# Patient Record
Sex: FEMALE | Race: WHITE | NOT HISPANIC OR LATINO | URBAN - METROPOLITAN AREA
[De-identification: names, ages, dates, MRNs, and addresses within clinical notes are randomized per-mention and may not be internally consistent; named-entity substitution may affect disease eponyms.]

---

## 2018-11-13 ENCOUNTER — APPOINTMENT (EMERGENCY)
Dept: RADIOLOGY | Facility: HOSPITAL | Age: 28
End: 2018-11-13
Attending: EMERGENCY MEDICINE
Payer: COMMERCIAL

## 2018-11-13 ENCOUNTER — HOSPITAL ENCOUNTER (EMERGENCY)
Facility: HOSPITAL | Age: 28
Discharge: HOME | End: 2018-11-13
Attending: EMERGENCY MEDICINE
Payer: COMMERCIAL

## 2018-11-13 VITALS
BODY MASS INDEX: 23.92 KG/M2 | WEIGHT: 130 LBS | OXYGEN SATURATION: 100 % | HEIGHT: 62 IN | TEMPERATURE: 98.3 F | SYSTOLIC BLOOD PRESSURE: 105 MMHG | HEART RATE: 73 BPM | RESPIRATION RATE: 18 BRPM | DIASTOLIC BLOOD PRESSURE: 56 MMHG

## 2018-11-13 DIAGNOSIS — R10.13 EPIGASTRIC PAIN: ICD-10-CM

## 2018-11-13 DIAGNOSIS — R55 VASOVAGAL SYNCOPE: Primary | ICD-10-CM

## 2018-11-13 LAB
ABO + RH BLD: NORMAL
ALBUMIN SERPL-MCNC: 3.1 G/DL (ref 3.4–5)
ALP SERPL-CCNC: 72 IU/L (ref 35–126)
ALT SERPL-CCNC: 16 IU/L (ref 11–54)
ANION GAP SERPL CALC-SCNC: 5 MEQ/L (ref 3–15)
AST SERPL-CCNC: 24 IU/L (ref 15–41)
BASOPHILS # BLD: 0 K/UL (ref 0.01–0.1)
BASOPHILS NFR BLD: 0 %
BILIRUB DIRECT SERPL-MCNC: 0.1 MG/DL
BILIRUB SERPL-MCNC: 0.4 MG/DL (ref 0.3–1.2)
BLD GP AB SCN SERPL QL: NEGATIVE
BUN SERPL-MCNC: 10 MG/DL (ref 8–20)
CALCIUM SERPL-MCNC: 8.4 MG/DL (ref 8.9–10.3)
CHLORIDE SERPL-SCNC: 107 MEQ/L (ref 98–109)
CO2 SERPL-SCNC: 25 MEQ/L (ref 22–32)
CREAT SERPL-MCNC: 0.7 MG/DL (ref 0.6–1.1)
D AG BLD QL: POSITIVE
DIFFERENTIAL METHOD BLD: ABNORMAL
EOSINOPHIL # BLD: 0.15 K/UL (ref 0.04–0.36)
EOSINOPHIL NFR BLD: 1 %
ERYTHROCYTE [DISTWIDTH] IN BLOOD BY AUTOMATED COUNT: 13.8 % (ref 11.7–14.4)
GFR SERPL CREATININE-BSD FRML MDRD: >60 ML/MIN/1.73M*2
GLUCOSE SERPL-MCNC: 140 MG/DL (ref 70–99)
HCG UR QL: NEGATIVE
HCT VFR BLDCO AUTO: 34.3 % (ref 35–45)
HGB BLD-MCNC: 11 G/DL (ref 11.8–15.7)
LABORATORY COMMENT REPORT: NORMAL
LIPASE SERPL-CCNC: 25 U/L (ref 20–51)
LYMPHOCYTES # BLD: 9.26 K/UL (ref 1.2–3.5)
LYMPHOCYTES NFR BLD: 60 %
MCH RBC QN AUTO: 29.6 PG (ref 28–33.2)
MCHC RBC AUTO-ENTMCNC: 32.1 G/DL (ref 32.2–35.5)
MCV RBC AUTO: 92.5 FL (ref 83–98)
MONOCYTES # BLD: 0.15 K/UL (ref 0.28–0.8)
MONOCYTES NFR BLD: 1 %
NEUTS BAND # BLD: 5.55 K/UL (ref 1.7–7)
NEUTS SEG NFR BLD: 36 %
PATH REV BLD -IMP: NORMAL
PDW BLD AUTO: 9.9 FL (ref 9.4–12.3)
PLAT MORPH BLD: NORMAL
PLATELET # BLD AUTO: 440 K/UL (ref 150–369)
PLATELET # BLD EST: ABNORMAL 10*3/UL
POTASSIUM SERPL-SCNC: 3.7 MEQ/L (ref 3.6–5.1)
PROT SERPL-MCNC: 6.2 G/DL (ref 6–8.2)
RBC # BLD AUTO: 3.71 M/UL (ref 3.93–5.22)
RBC MORPH BLD: NORMAL
SMUDGE CELLS BLD QL SMEAR: ABNORMAL
SODIUM SERPL-SCNC: 137 MEQ/L (ref 136–144)
VARIANT LYMPHS NFR BLD: 2 %
WBC # BLD AUTO: 15.43 K/UL (ref 3.8–10.5)

## 2018-11-13 PROCEDURE — 36415 COLL VENOUS BLD VENIPUNCTURE: CPT | Performed by: PHYSICIAN ASSISTANT

## 2018-11-13 PROCEDURE — 80048 BASIC METABOLIC PNL TOTAL CA: CPT | Performed by: PHYSICIAN ASSISTANT

## 2018-11-13 PROCEDURE — 83690 ASSAY OF LIPASE: CPT | Performed by: PHYSICIAN ASSISTANT

## 2018-11-13 PROCEDURE — 99284 EMERGENCY DEPT VISIT MOD MDM: CPT | Mod: 25

## 2018-11-13 PROCEDURE — 80076 HEPATIC FUNCTION PANEL: CPT | Performed by: PHYSICIAN ASSISTANT

## 2018-11-13 PROCEDURE — 71045 X-RAY EXAM CHEST 1 VIEW: CPT

## 2018-11-13 PROCEDURE — 3E0337Z INTRODUCTION OF ELECTROLYTIC AND WATER BALANCE SUBSTANCE INTO PERIPHERAL VEIN, PERCUTANEOUS APPROACH: ICD-10-PCS | Performed by: EMERGENCY MEDICINE

## 2018-11-13 PROCEDURE — 93005 ELECTROCARDIOGRAM TRACING: CPT | Performed by: PHYSICIAN ASSISTANT

## 2018-11-13 PROCEDURE — 86901 BLOOD TYPING SEROLOGIC RH(D): CPT

## 2018-11-13 PROCEDURE — 25800000 HC PHARMACY IV SOLUTIONS: Performed by: PHYSICIAN ASSISTANT

## 2018-11-13 PROCEDURE — 84703 CHORIONIC GONADOTROPIN ASSAY: CPT | Performed by: PHYSICIAN ASSISTANT

## 2018-11-13 PROCEDURE — 85025 COMPLETE CBC W/AUTO DIFF WBC: CPT | Performed by: PHYSICIAN ASSISTANT

## 2018-11-13 PROCEDURE — 96360 HYDRATION IV INFUSION INIT: CPT

## 2018-11-13 RX ADMIN — SODIUM CHLORIDE 1000 ML: 9 INJECTION, SOLUTION INTRAVENOUS at 14:52

## 2018-11-13 ASSESSMENT — ENCOUNTER SYMPTOMS: SYNCOPE: 1

## 2018-11-13 NOTE — DISCHARGE INSTRUCTIONS
Please return to the ED if you develop worsening symptoms, unable to tolerate fluids by mouth, uncontrollable vomiting or diarrhea, unable to urinate, blood in your stools, vomiting blood, lightheadedness/dizziness, fever, chills, or any other concerning symptoms.     Follow up with your healthcare provider for re-evaluation.

## 2018-11-13 NOTE — ED PROVIDER NOTES
HPI     Chief Complaint   Patient presents with   • Syncope     Pt is a 28yF with PMH of UC who presents to the ED for evaluation s/p syncope x2. Pt reports yesterday she had two episodes of BRBPR c/w UC flair. She was at work today when she developed sharp, epigastric abdominal pain which lasted only a few seconds. After which she passed out. After first syncopal episode a coworker placed an IV and Pt received a liter of IVF, after which she felt much better. She reports she felt otherwise well throughout the day until second episode of pain and syncope, prompting her to come to the ED for evaluation.    Pt reports similar syncopal episode in high school after BRBPR r/t UC flair.    History provided by:  Patient  Syncope   Episode history:  Multiple  Most recent episode:  Today  Duration: seconds.  Timing:  Intermittent  Progression:  Resolved  Context comment:  Sharp pain  Witnessed: yes    Relieved by:  Lying down (IVF bolus)  Worsened by:  Nothing  Associated symptoms: no anxiety, no chest pain, no confusion, no diaphoresis, no difficulty breathing, no dizziness, no fever, no focal weakness, no headaches, no malaise/fatigue, no nausea, no palpitations, no seizures, no shortness of breath, no visual change, no vomiting and no weakness         Patient History     Past Medical History:   Diagnosis Date   • Ulcerative colitis (CMS/HCC) (HCC)        History reviewed. No pertinent surgical history.    History reviewed. No pertinent family history.    Social History   Substance Use Topics   • Smoking status: Never Smoker   • Smokeless tobacco: Never Used   • Alcohol use Yes      Comment: occas       Systems Reviewed from Nursing Triage:          Review of Systems     Review of Systems   Constitutional: Negative for appetite change, chills, diaphoresis, fatigue, fever and malaise/fatigue.   HENT: Negative for sore throat.    Respiratory: Negative for cough and shortness of breath.    Cardiovascular: Positive for  "syncope. Negative for chest pain and palpitations.   Gastrointestinal: Negative for abdominal distention, blood in stool, constipation, diarrhea, nausea and vomiting.   Genitourinary: Negative for difficulty urinating, dysuria, frequency, hematuria and urgency.   Musculoskeletal: Negative for back pain.   Skin: Negative for color change, pallor and rash.   Neurological: Negative for dizziness, focal weakness, seizures, weakness, light-headedness, numbness and headaches.   Psychiatric/Behavioral: Negative for confusion.   All other systems reviewed and are negative.       Physical Exam     ED Triage Vitals   Temp Heart Rate Resp BP SpO2   11/13/18 1405 11/13/18 1405 11/13/18 1405 11/13/18 1405 11/13/18 1405   (!) 36.1 °C (96.9 °F) 71 16 (!) 95/50 100 %      Temp Source Heart Rate Source Patient Position BP Location FiO2 (%) (Set)   11/13/18 1618 11/13/18 1452 11/13/18 1452 11/13/18 1452 --   Tympanic Monitor Lying Right upper arm                      Patient Vitals for the past 24 hrs:   BP Temp Pulse Resp SpO2 Height Weight   11/13/18 1405 (!) 95/50 (!) 36.1 °C (96.9 °F) 71 16 100 % 1.575 m (5' 2\") 59 kg (130 lb)           Physical Exam   Constitutional: She is oriented to person, place, and time. She appears well-developed and well-nourished. No distress.   Cardiovascular: Normal rate, regular rhythm, normal heart sounds and intact distal pulses.    No murmur heard.  Pulmonary/Chest: Effort normal and breath sounds normal. No respiratory distress. She exhibits no tenderness.   Abdominal: Soft. Bowel sounds are normal. She exhibits no distension. There is no tenderness. There is no rebound and no guarding.   Genitourinary: Rectum normal. Rectal exam shows no external hemorrhoid, no internal hemorrhoid, no fissure, no mass, no tenderness, anal tone normal and guaiac negative stool.   Genitourinary Comments: Chaperone: Cathie Waggoner, med student.   Musculoskeletal: Normal range of motion. She exhibits no edema. "   Neurological: She is alert and oriented to person, place, and time.   Skin: Skin is warm and dry. Capillary refill takes less than 2 seconds.   Nursing note and vitals reviewed.           ECG 12 lead  Date/Time: 11/13/2018 2:03 PM  Performed by: CORTES CARLTON  Authorized by: GUSTABO GOOD     ECG reviewed by ED Physician in the absence of a cardiologist: yes    Interpretation:     Interpretation: normal    Rate:     ECG rate:  66    ECG rate assessment: normal    Rhythm:     Rhythm: sinus rhythm    QRS:     QRS axis:  Normal    QRS intervals:  Normal  Conduction:     Conduction: normal    ST segments:     ST segments:  Normal  T waves:     T waves: normal          ED Course & MDM     Labs Reviewed   BASIC METABOLIC PANEL - Abnormal        Result Value    Sodium 137      Potassium 3.7      Chloride 107      CO2 25      BUN 10      Creatinine 0.7      Glucose 140 (*)     Calcium 8.4 (*)     eGFR >60.0      Anion Gap 5     HEPATIC FUNCTION PANEL - Abnormal     Albumin 3.1 (*)     Bilirubin, Total 0.4      Bilirubin, Direct 0.1      Alkaline Phosphatase 72      AST (SGOT) 24      ALT (SGPT) 16      Total Protein 6.2     CBC - Abnormal     WBC 15.43 (*)     RBC 3.71 (*)     Hemoglobin 11.0 (*)     Hematocrit 34.3 (*)     MCV 92.5      MCH 29.6      MCHC 32.1 (*)     RDW 13.8      Platelets 440 (*)     MPV 9.9     DIFF COUNT - Abnormal     Differential Type Manu      Neutrophils 36      Lymphocytes 60      Monocytes 1      Eosinophils 1      Basophils 0      Atypical Lymphocytes 2      Neutrophils, Absolute 5.55      Lymphocytes, Absolute 9.26 (*)     Monocytes, Absolute 0.15 (*)     Eosinophils, Absolute 0.15      Basophils, Absolute 0.00 (*)     Platelet Estimate Increased (>400,000)      RBC Morphology Normal      PLT Morphology Normal      Smudge Cells Occasional     BHCG, SERUM, QUAL - Normal    Preg Test, Serum Negative     LIPASE - Normal    Lipase 25     CBC AND DIFFERENTIAL    Narrative:     The following  orders were created for panel order CBC and differential.  Procedure                               Abnormality         Status                     ---------                               -----------         ------                     CBC[[id number]]                           Abnormal            Final result               Diff Count[[id number]]                    Abnormal            Final result               Pathology Slide Review[[id number]]                            Final result                 Please view results for these tests on the individual orders.   TYPE AND SCREEN    Antibody Screen Negative      ABO O      Rh Factor Positive      History Check No type on file     PATH REVIEW    Pathology Review        Value: Absolute lymphocytosis, rule out viral infection,rule out lymphoproliferative disorder.     Electronically signed by Андрей Carroll MD on November 13, 2018 at 3:45 PM.       X-RAY CHEST 1 VIEW   Final Result   IMPRESSION: No acute abnormality in the chest.      COMMENT: An AP radiograph of the chest is reviewed without prior studies   available for direct comparison. The lung fields are well aerated without focal   consolidation to suggest pneumonia. There is no pleural effusion, pulmonary   edema, or pneumothorax. The cardiomediastinal silhouette is within normal   limits. The visualized osseous structures are unremarkable.         ECG 12 lead    (Results Pending)               [facility]         ED Course as of Nov 14 2143 Wed Nov 14, 2018 2142 Mild anemia, moderate leukocytosis w/o focal abd findings on exam.  Negative orthostatis VS after 1L IVF bolus.   Will d/c home to f/u with PCP and YOVANI Chan. Pt agreeable.  [KL]      ED Course User Index  [KL] Yue Sullivan PA C         Clinical Impressions as of Nov 14 2143   Vasovagal syncope   Epigastric pain - sudden, resolved     Disposition: Discharge       Yue Sullivan PA C  11/14/18 2143

## 2018-11-14 ASSESSMENT — ENCOUNTER SYMPTOMS
WEAKNESS: 0
DIZZINESS: 0
LIGHT-HEADEDNESS: 0
FATIGUE: 0
SORE THROAT: 0
BLOOD IN STOOL: 0
HEADACHES: 0
FREQUENCY: 0
HEMATURIA: 0
VISUAL CHANGE: 0
DIAPHORESIS: 0
DIARRHEA: 0
DIFFICULTY BREATHING: 0
DIFFICULTY URINATING: 0
FOCAL WEAKNESS: 0
VOMITING: 0
DYSURIA: 0
COUGH: 0
NUMBNESS: 0
CHILLS: 0
PALPITATIONS: 0
SEIZURES: 0
COLOR CHANGE: 0
FEVER: 0
APPETITE CHANGE: 0
NAUSEA: 0
SHORTNESS OF BREATH: 0
CONFUSION: 0
CONSTIPATION: 0
BACK PAIN: 0
ABDOMINAL DISTENTION: 0

## 2018-11-14 NOTE — ED ATTESTATION NOTE
I have personally seen and examined the patient.  I reviewed and agree with physician assistant / nurse practitioner’s assessment and plan of care, with the following exceptions: None  My examination, assessment, and plan of care of Graciela Paredes is as follows:     Exam: Well-appearing, no acute distress, awake alert oriented x3, regular rate and rhythm, lungs clear to auscultation, abdomen soft nontender, no guarding or rebound, normal pulses no lower extremity edema    Assessment and plan: Patient with episodes of syncope after severe epigastric abdominal pain, had preceding symptoms has passed out previously, now fully recovered, labs unremarkable, abdomen benign and no continued abdominal pain, likely vasovagal, symptomatic treatment and outpatient follow-up    Normal sinus rhythm at 73, pulse ox 100% normal     Dave Paulino,   11/13/18 8089

## 2018-11-15 ENCOUNTER — TRANSCRIBE ORDERS (OUTPATIENT)
Dept: SCHEDULING | Age: 28
End: 2018-11-15

## 2018-11-15 DIAGNOSIS — R10.84 GENERALIZED ABDOMINAL PAIN: Primary | ICD-10-CM

## 2018-11-15 LAB
ATRIAL RATE: 66
P AXIS: 53
PR INTERVAL: 172
QRS DURATION: 74
QT INTERVAL: 386
QTC CALCULATION(BAZETT): 404
R AXIS: 44
T WAVE AXIS: 32
VENTRICULAR RATE: 66

## 2022-11-23 ENCOUNTER — OFFICE VISIT (OUTPATIENT)
Dept: GASTROENTEROLOGY | Facility: CLINIC | Age: 32
End: 2022-11-23

## 2022-11-23 ENCOUNTER — APPOINTMENT (OUTPATIENT)
Dept: LAB | Facility: CLINIC | Age: 32
End: 2022-11-23

## 2022-11-23 VITALS
DIASTOLIC BLOOD PRESSURE: 70 MMHG | WEIGHT: 132 LBS | BODY MASS INDEX: 24.29 KG/M2 | TEMPERATURE: 97.7 F | SYSTOLIC BLOOD PRESSURE: 108 MMHG | HEIGHT: 62 IN

## 2022-11-23 DIAGNOSIS — K51.811 OTHER ULCERATIVE COLITIS WITH RECTAL BLEEDING (HCC): ICD-10-CM

## 2022-11-23 DIAGNOSIS — M19.90 ARTHRITIS: ICD-10-CM

## 2022-11-23 DIAGNOSIS — R19.4 CHANGE IN BOWEL HABIT: ICD-10-CM

## 2022-11-23 DIAGNOSIS — K51.811 OTHER ULCERATIVE COLITIS WITH RECTAL BLEEDING (HCC): Primary | ICD-10-CM

## 2022-11-23 DIAGNOSIS — K62.5 RECTAL BLEEDING: ICD-10-CM

## 2022-11-23 PROBLEM — K51.90 ULCERATIVE COLITIS (HCC): Status: ACTIVE | Noted: 2022-11-23

## 2022-11-23 LAB
ALBUMIN SERPL BCP-MCNC: 2.8 G/DL (ref 3.5–5)
ALP SERPL-CCNC: 87 U/L (ref 46–116)
ALT SERPL W P-5'-P-CCNC: 17 U/L (ref 12–78)
ANION GAP SERPL CALCULATED.3IONS-SCNC: 7 MMOL/L (ref 4–13)
AST SERPL W P-5'-P-CCNC: 15 U/L (ref 5–45)
BASOPHILS # BLD MANUAL: 0 THOUSAND/UL (ref 0–0.1)
BASOPHILS NFR MAR MANUAL: 0 % (ref 0–1)
BILIRUB SERPL-MCNC: 0.29 MG/DL (ref 0.2–1)
BUN SERPL-MCNC: 9 MG/DL (ref 5–25)
CALCIUM ALBUM COR SERPL-MCNC: 9.8 MG/DL (ref 8.3–10.1)
CALCIUM SERPL-MCNC: 8.8 MG/DL (ref 8.3–10.1)
CHLORIDE SERPL-SCNC: 104 MMOL/L (ref 96–108)
CO2 SERPL-SCNC: 24 MMOL/L (ref 21–32)
CREAT SERPL-MCNC: 0.73 MG/DL (ref 0.6–1.3)
EOSINOPHIL # BLD MANUAL: 1.36 THOUSAND/UL (ref 0–0.4)
EOSINOPHIL NFR BLD MANUAL: 7 % (ref 0–6)
ERYTHROCYTE [DISTWIDTH] IN BLOOD BY AUTOMATED COUNT: 13.8 % (ref 11.6–15.1)
GFR SERPL CREATININE-BSD FRML MDRD: 107 ML/MIN/1.73SQ M
GLUCOSE SERPL-MCNC: 88 MG/DL (ref 65–140)
HBV SURFACE AB SER-ACNC: >1000 MIU/ML
HBV SURFACE AG SER QL: NORMAL
HCT VFR BLD AUTO: 38.1 % (ref 34.8–46.1)
HGB BLD-MCNC: 12.4 G/DL (ref 11.5–15.4)
LYMPHOCYTES # BLD AUTO: 33 % (ref 14–44)
LYMPHOCYTES # BLD AUTO: 6.4 THOUSAND/UL (ref 0.6–4.47)
MCH RBC QN AUTO: 29.7 PG (ref 26.8–34.3)
MCHC RBC AUTO-ENTMCNC: 32.5 G/DL (ref 31.4–37.4)
MCV RBC AUTO: 91 FL (ref 82–98)
MONOCYTES # BLD AUTO: 1.55 THOUSAND/UL (ref 0–1.22)
MONOCYTES NFR BLD: 8 % (ref 4–12)
NEUTROPHILS # BLD MANUAL: 10.09 THOUSAND/UL (ref 1.85–7.62)
NEUTS BAND NFR BLD MANUAL: 9 % (ref 0–8)
NEUTS SEG NFR BLD AUTO: 43 % (ref 43–75)
PLATELET # BLD AUTO: 691 THOUSANDS/UL (ref 149–390)
PLATELET BLD QL SMEAR: ABNORMAL
PMV BLD AUTO: 9.2 FL (ref 8.9–12.7)
POTASSIUM SERPL-SCNC: 3.8 MMOL/L (ref 3.5–5.3)
PROT SERPL-MCNC: 7.3 G/DL (ref 6.4–8.4)
RBC # BLD AUTO: 4.17 MILLION/UL (ref 3.81–5.12)
SODIUM SERPL-SCNC: 135 MMOL/L (ref 135–147)
WBC # BLD AUTO: 19.4 THOUSAND/UL (ref 4.31–10.16)

## 2022-11-23 RX ORDER — PREDNISONE 20 MG/1
20 TABLET ORAL DAILY
Qty: 30 TABLET | Refills: 1 | Status: SHIPPED | OUTPATIENT
Start: 2022-11-23 | End: 2022-12-23

## 2022-11-23 RX ORDER — POLYETHYLENE GLYCOL 3350 17 G/17G
POWDER, FOR SOLUTION ORAL
Qty: 238 G | Refills: 0 | Status: SHIPPED | OUTPATIENT
Start: 2022-11-23

## 2022-11-23 NOTE — PROGRESS NOTES
[facility] - Outpatient Consultation  Osmel Boland 27 y o  female MRN: [id number]  Encounter: [id number]          ASSESSMENT AND PLAN:      1  Other ulcerative colitis with rectal bleeding (HCC)  - Stool Enteric Bacterial Panel by PCR; Future  - Calprotectin,Fecal; Future  - Giardia antigen; Future  - Clostridium difficile toxin by PCR; Future  - TPMT ENZYME ACTIVITY,BLOOD; Future  - Hepatitis B surface antibody; Future  - Hepatitis B surface antigen; Future  - CBC and differential; Future  - Comprehensive metabolic panel; Future  - predniSONE 20 mg tablet; Take 1 tablet (20 mg total) by mouth daily  Dispense: 30 tablet; Refill: 1  - hydrocortisone-pramoxine (PROCTOFOAM-HC) 1-1 % FOAM rectal foam; Insert 1 applicator into the rectum 2 (two) times a day  Dispense: 30 g; Refill: 2  - Colonoscopy; Future  - polyethylene glycol (GLYCOLAX) 17 GM/SCOOP powder; At 5pm take 5mgx2 dulcolax  At 6pm 32oz miralax in 64oz gatorade  Drink 8oz glass every 5 mins until 32oz finished  Drink remaining as rec  Dispense: 238 g; Refill: 0  - Quantiferon TB Gold Plus; Future    2  Rectal bleeding  - Colonoscopy; Future    3  Arthritis  4  Change in bowel habit    - Celiac Disease Panel; Future  - Ova and parasite examination; Future  - Colonoscopy; Future    ______________________________________________________________________    HPI:      She is a 77-year-old female with previous history of moderate ulcerative colitis with possible history of pan colitis  Previous records are not available to us so history was mostly obtained from the patient  She would like to proceed with more of consider management  She has previously tried adalimumab and mesalamine based products  Adalimumab was uptitrated to Q weekly but due to side effects and poor tolerance she requested to discontinue it  Saw the side effects she was experiencing were abdominal discomfort, joint pain    She did not have a good response or complete remission  She did have some improvement of some gastrointestinal symptoms  She has had a flare over the last several weeks  She did have steroids at home which she started taking and has had some improvement  She has taken steroids for the last 3 weeks starting with prednisone 40 mg and titrating down to 10 mg now  During the flare she has had fecal incontinence and unable to complete her ADLs  Complicating the flare she also has extraintestinal manifestation with ankle pain  She would prefer to avoid immunosuppressive medication if possible  She was diagnosed with ulcerative colitis at age of 4  She has seen multiple gastroenterologists in the past   She lives an hour away from here  REVIEW OF SYSTEMS:    CONSTITUTIONAL: Denies any fever, chills, rigors, and weight loss  HEENT: No earache or tinnitus  Denies hearing loss or visual disturbances  CARDIOVASCULAR: No chest pain or palpitations  RESPIRATORY: Denies any cough, hemoptysis, shortness of breath or dyspnea on exertion  GASTROINTESTINAL: As noted in the History of Present Illness  GENITOURINARY: No problems with urination  Denies any hematuria or dysuria  NEUROLOGIC: No dizziness or vertigo, denies headaches  MUSCULOSKELETAL: Denies any muscle or joint pain  SKIN: Denies skin rashes or itching  ENDOCRINE: Denies excessive thirst  Denies intolerance to heat or cold  PSYCHOSOCIAL: Denies depression or anxiety  Denies any recent memory loss  Historical Information   History reviewed  No pertinent past medical history  History reviewed  No pertinent surgical history    Social History   Social History     Substance and Sexual Activity   Alcohol Use Never     Social History     Substance and Sexual Activity   Drug Use Never     Social History     Tobacco Use   Smoking Status Never   Smokeless Tobacco Never     Family History   Problem Relation Age of Onset   • Colon cancer Maternal Grandfather    • Crohn's disease Maternal Uncle        Meds/Allergies       Current Outpatient Medications:   •  hydrocortisone-pramoxine (PROCTOFOAM-HC) 1-1 % FOAM rectal foam  •  polyethylene glycol (GLYCOLAX) 17 GM/SCOOP powder  •  predniSONE 20 mg tablet    No Known Allergies        Objective     Blood pressure 108/70, temperature 97 7 °F (36 5 °C), temperature source Tympanic, height 5' 2" (1 575 m), weight 59 9 kg (132 lb)  Body mass index is 24 14 kg/m²  PHYSICAL EXAM:      General Appearance:   Alert, cooperative, no distress   HEENT:   Normocephalic, atraumatic, anicteric      Neck:  Supple, symmetrical, trachea midline   Lungs:   Clear to auscultation bilaterally; no rales, rhonchi or wheezing; respirations unlabored    Heart[de-identified]   Regular rate and rhythm; no murmur, rub, or gallop  Abdomen:   Soft, non-tender, non-distended; normal bowel sounds; no masses, no organomegaly    Genitalia:   Deferred    Rectal:   Deferred    Extremities:  No cyanosis, clubbing or edema    Pulses:  2+ and symmetric    Skin:  No jaundice, rashes, or lesions    Lymph nodes:  No palpable cervical lymphadenopathy        Lab Results:   No visits with results within 1 Day(s) from this visit  Latest known visit with results is:   No results found for any previous visit  Radiology Results:   No results found

## 2022-11-23 NOTE — PATIENT INSTRUCTIONS
Scheduled date of colonoscopy (as of today): 12/23/22  Physician performing colonoscopy: Dr Her   Location of colonoscopy: [place]   Bowel prep reviewed with patient: miralax  Instructions reviewed with patient by: Colby Lawton   Clearances:  n/a   Ok'd by GI Lab

## 2022-11-25 ENCOUNTER — TELEPHONE (OUTPATIENT)
Dept: GASTROENTEROLOGY | Facility: AMBULARY SURGERY CENTER | Age: 32
End: 2022-11-25

## 2022-11-25 LAB
ENDOMYSIUM IGA SER QL: NEGATIVE
GLIADIN PEPTIDE IGA SER-ACNC: 9 UNITS (ref 0–19)
GLIADIN PEPTIDE IGG SER-ACNC: 28 UNITS (ref 0–19)
IGA SERPL-MCNC: 254 MG/DL (ref 87–352)
TTG IGA SER-ACNC: <2 U/ML (ref 0–3)
TTG IGG SER-ACNC: <2 U/ML (ref 0–5)

## 2022-11-25 NOTE — TELEPHONE ENCOUNTER
Patients GI provider:  Dr. Her    Number to return call: (  [phone]    Reason for call: Pt calling to reschedule her colon    Scheduled procedure/appointment date if applicable: Apt/procedure  12/23/22

## 2022-11-28 LAB
GAMMA INTERFERON BACKGROUND BLD IA-ACNC: 0.04 IU/ML
M TB IFN-G BLD-IMP: NEGATIVE
M TB IFN-G CD4+ BCKGRND COR BLD-ACNC: -0.01 IU/ML
M TB IFN-G CD4+ BCKGRND COR BLD-ACNC: 0 IU/ML
MITOGEN IGNF BCKGRD COR BLD-ACNC: >10 IU/ML
REF LAB TEST METHOD: NORMAL
TEST INTERPRETATION: NORMAL
TPMT RBC-CCNC: 8.7 UNITS/ML RBC

## 2022-11-29 NOTE — PROGRESS NOTES
TC from pt calling to r/s procedure  Scheduled date of colonoscopy (as of today):  1/10/23  Physician performing colonoscopy:  Dr Dave Espinoza  Location of colonoscopy:  [facility]  Bowel prep reviewed with patient:  Dulcolax/Miralax  Instructions reviewed with patient by:  Clearances: n/a    ASC Assessment    If the patient answers yes to any of these questions, schedule in a hospital  Are you pregnant: No  Do you rely on a wheelchair for mobility: No  Have you been diagnosed with End Stage Renal Disease (ESRD): No  Do you need oxygen during the day: No  Have you had a heart attack or stroke within the past three months: No  Have you had a seizure within the past three months: No  Have you ever been informed by anesthesia that you have a difficult airway: No  Additional Questions  Have you had any cardiac testing or are under the care of a Cardiologist (see cardiac list): No  Cardiac list:   Do you have an implanted cardiac defibrillator: No (Comment:  This patient should be scheduled in the hospital)    Have any bleeding problems, such as anemia or hemophilia (If patient has H&H result below 8, schedule in hospital   H&H must be within 30 days of procedure): No    Had an organ transplant within the past 3 months: No    Do you have any present infections: No  Do you get short of breath when walking a few blocks: No  Have you been diagnosed with diabetes: No  Comments (provide cardiac provider information if applicable):

## 2022-12-01 ENCOUNTER — TELEPHONE (OUTPATIENT)
Dept: GASTROENTEROLOGY | Facility: CLINIC | Age: 32
End: 2022-12-01

## 2022-12-01 DIAGNOSIS — A04.72 C. DIFFICILE COLITIS: Primary | ICD-10-CM

## 2022-12-01 DIAGNOSIS — K51.811 OTHER ULCERATIVE COLITIS WITH RECTAL BLEEDING (HCC): Primary | ICD-10-CM

## 2022-12-01 RX ORDER — VANCOMYCIN HYDROCHLORIDE 125 MG/1
125 CAPSULE ORAL 4 TIMES DAILY
Qty: 56 CAPSULE | Refills: 0 | Status: SHIPPED | OUTPATIENT
Start: 2022-12-01 | End: 2022-12-15

## 2022-12-01 RX ORDER — PREDNISONE 1 MG/1
5 TABLET ORAL DAILY
Qty: 5 TABLET | Refills: 0 | Status: SHIPPED | OUTPATIENT
Start: 2022-12-01 | End: 2022-12-06

## 2022-12-01 NOTE — TELEPHONE ENCOUNTER
Patient called in this morning stating [facility] called her last night saying the had urgent results about her stool test, they thought they were calling the office but called the patient  So she would like to know what to do since she started the steroids already thinking everything was negative   Please advise

## 2022-12-04 ENCOUNTER — TELEPHONE (OUTPATIENT)
Dept: OTHER | Facility: OTHER | Age: 32
End: 2022-12-04

## 2022-12-04 ENCOUNTER — TELEPHONE (OUTPATIENT)
Dept: GASTROENTEROLOGY | Facility: CLINIC | Age: 32
End: 2022-12-04

## 2022-12-05 ENCOUNTER — PREP FOR PROCEDURE (OUTPATIENT)
Dept: GASTROENTEROLOGY | Facility: CLINIC | Age: 32
End: 2022-12-05

## 2022-12-05 ENCOUNTER — TELEPHONE (OUTPATIENT)
Dept: GASTROENTEROLOGY | Facility: CLINIC | Age: 32
End: 2022-12-05

## 2022-12-05 NOTE — TELEPHONE ENCOUNTER
Dennis Morrow from [facility] called wanting to speak with on call regarding a consult for patient  On call notified via TC

## 2022-12-05 NOTE — TELEPHONE ENCOUNTER
Patients GI provider:  Dr Jayla Lazo    Number to return call: [phone]     Reason for call:   pt William Peña  1990 requesting to speak with a nurse  patients boyfriend called very upset stating we overlooked a lab for patient and was misdiagnosed  patient is currently very sick and wanting to speak with someone asap please reach out to patient   Boyfriend thinks she is having a reaction to medication that was prescribed by Dr Jayla Lazo thank you     Scheduled procedure/appointment date if applicable: n/a

## 2022-12-05 NOTE — TELEPHONE ENCOUNTER
Information only from health care provider at [facility]  I received a notification from health call RN regarding ER team trying to reach me from [facility] as patient is currently there  Their question was whether the patient should continue steroids which were recently prescribed  And to arrange close outpatient follow up  As per ER team, patient presented with not being able to tolerate diet but after symptomatic therapy feeling better  They obtained CT which showed diffuse colitis  I checked to see if the patient is not tolerating PO diet she might need admission; but was told she is doing better now and was appropriate for dc from their standpoint  To answer their question I recommended to hold steroids till she can finish the steroid therapy; and I sent a msg to the scheduling team to arrange sooner follow up

## 2022-12-05 NOTE — TELEPHONE ENCOUNTER
Katarina Zazueta called from [facility] called, requesting a call back from on call provider, regarding a consultation

## 2022-12-05 NOTE — TELEPHONE ENCOUNTER
Spoke with patient and with verbal consent to patient's boyfriend at length  Patient stated she was seen in office 11/23 and stool testing was ordered  Patient stated she was originally diagnosed with a flare of ulcerative colitis  Then, received a call from office informing her that her results were normal  Finally, on December 1st patient received a call that she is positive for C  Diff  Patient expressed concerns that no one from the office educated her regarding C  Diff precautions  I reviewed C  Diff precautions with patient  Patient stated she has been having intercourse with her boyfriend and he is now experiencing diarrhea  I advised patient that patient's boyfriend should be evaluated by a medical professional regarding diarrhea  Patient stated she was evaluated in the ER yesterday due to a fever and being unable to tolerate PO intake  I apologized to patient regarding miscommunication and allowed patient to express her concerns  Patient then had her boyfriend speak to me and he expressed frustration and concern regarding her care  Patient's boyfriend explained the issue again regarding test results  Patient's boyfriend stated that patient is unable to keep any foods or liquids down  Patient's boyfriend expressed frustration regarding "misdiagnosis" and stated that I "almost killed her"  I apologized to patient's boyfriend and explained that I am the nurse at the office and was not involved in patient's care  Patient's boyfriend also expressed concern regarding call center breaking HIPAA  I apologized to patient's boyfriend and explained to him that I unfortunately do not work at the call center and this will be adressed with their management  Patient's boyfriend stated that he was expecting a call from Dr Trina Elam and has not yet  Patient's boyfriend stated 2-3 times that he is expecting a call from Dr Trina Elam within one hour or he will show up to the office to speak with him in person   I apologized to patient's boyfriend about their experience and reassured patient and her boyfriend that it will be addressed  I began explaining that I would reach out to Dr Khris Perez and he will call patient as soon as possible  Patient's boyfriend hung up phone mid conversation  Spoke with Dr Khris Perez and he explained that he would reach out to patient

## 2022-12-06 NOTE — TELEPHONE ENCOUNTER
Called and spoke with patient regarding HIPPA violation complaint  I apologized to patient if she thought there was breach  Pt express because the person her boyfriend was talking to did not ask for her name or date of birth, they thought this was a HIPPA violation  Pt then expressed it wasn't really HIPPA as her name is attached to the phone number  I explained to the patient that our calling system does have caller ID and the agents are able to identify the information with that  The patient was satisfied with that and stated someone was supposed to call her and schedule a video visit with Dr Ramandeep Forte within 2-3 days per Dr Ramandeep Forte  I apologized that no one has reached out to her and informed her I could schedule that appt for her  I scheduled her virtual visit for tomorrow, as I was in the process of scheduling, pt stated she was getting a call from the office  After scheduling the appt, pt agreed and I informed her if that time did not work she can call back to reschedule the appt  Pt agreed and then took her other call

## 2022-12-06 NOTE — TELEPHONE ENCOUNTER
Discussed findings of c diff with the patient  She was also diagnosed with COVID  It is an unfortunate situation as c diff has led to an active IBD flare  She was upset as she feel she was not given more detailed info regarding c diff  We will plan to address this part better  Unfortunately, the steroids were started before she saw us in the office which led to her symptoms getting progressively worse  I also suspect she likely has c diff for several months based on her symptoms  Today I discussed the followin  Wean off of steroids over the next 3 days   2  Continue anti nausea meds and oral vancomycin if she cannot tolerate vancomycin due to nausea consider hospitalization   3  We would fill out paperwork to help her get time off from work   4   Plan for virtual visit in next 2-3 days

## 2022-12-07 ENCOUNTER — TELEPHONE (OUTPATIENT)
Dept: OTHER | Facility: OTHER | Age: 32
End: 2022-12-07

## 2022-12-07 ENCOUNTER — TELEMEDICINE (OUTPATIENT)
Dept: GASTROENTEROLOGY | Facility: CLINIC | Age: 32
End: 2022-12-07

## 2022-12-07 DIAGNOSIS — A04.72 C. DIFFICILE COLITIS: ICD-10-CM

## 2022-12-07 DIAGNOSIS — R11.0 NAUSEA: ICD-10-CM

## 2022-12-07 DIAGNOSIS — K51.018 ULCERATIVE PANCOLITIS WITH OTHER COMPLICATION (HCC): Primary | ICD-10-CM

## 2022-12-07 DIAGNOSIS — U07.1 COVID: ICD-10-CM

## 2022-12-07 DIAGNOSIS — R19.4 CHANGE IN BOWEL HABIT: ICD-10-CM

## 2022-12-07 DIAGNOSIS — K62.5 RECTAL BLEEDING: ICD-10-CM

## 2022-12-07 RX ORDER — VANCOMYCIN HYDROCHLORIDE 125 MG/1
125 CAPSULE ORAL 4 TIMES DAILY
Qty: 12 CAPSULE | Refills: 0 | Status: SHIPPED | OUTPATIENT
Start: 2022-12-07 | End: 2022-12-10

## 2022-12-07 NOTE — PROGRESS NOTES
Virtual Regular Visit    Verification of patient location:    Patient is located in the following state in which I hold an active license [place]      Assessment/Plan:    Problem List Items Addressed This Visit        Digestive    Ulcerative colitis ([address] ) - Primary    Rectal bleeding    C  difficile colitis    Relevant Medications    vancomycin (VANCOCIN) 125 MG capsule       Other    Change in bowel habit    Relevant Medications    vancomycin (VANCOCIN) 125 MG capsule    COVID    Nausea     She was scheduled for urgent visit due to multiple comorbidities    - continue 14 days of vancomycin oral  - continue anti nausea medication   - start probiotics   -Taper steroids  -Close follow-up due to debilitating symptoms      Reason for visit is   Chief Complaint   Patient presents with   • Follow-up     Discuss treatment    • Virtual Regular Visit        Encounter provider Thong Hong MD    Provider located at [address] [place] [address] [facility]  [address] [facility] 76  [phone]      Recent Visits  Date Type Provider Dept   12/05/22 Telephone MD Cee Lainez   12/05/22 Telephone [address] [facility] [address]   12/01/22 Telephone Jasmin Conchita [address] [facility]   Showing recent visits within past 7 days and meeting all other requirements  Today's Visits  Date Type Provider Dept   12/07/22 Shira Caruso MD [facility] today's visits and meeting all other requirements  Future Appointments  No visits were found meeting these conditions  Showing future appointments within next 150 days and meeting all other requirements       The patient was identified by name and date of birth  Bahman Neumann was informed that this is a telemedicine visit and that the visit is being conducted through the [employer]  She agrees to proceed     My office door was closed  No one else was in the room  She acknowledged consent and understanding of privacy and security of the video platform  The patient has agreed to participate and understands they can discontinue the visit at any time  Patient is aware this is a billable service  Brandon Pérez is a 27 y o  female with history of ulcerative colitis at the age of age presents here for virtual visit for follow-up  She had an acute flare of IBD and she started steroids which she had leftover from previous flares  Symptoms started back in September 2022  Symptoms did mildly improve with steroid therapy but stool studies demonstrated C  difficile colitis which is likely the cause of acute symptoms  Patient also informs he was diagnosed with COVID in setting of recent steroid use  She is debility and frustrated by her symptoms  She is also having difficulty  with tolerating oral vancomycin  She is well-hydrated and does not require to be hospitalized at this time  She was recently hospitalized for dehydration and supportive care  HPI     No past medical history on file  No past surgical history on file  Current Outpatient Medications   Medication Sig Dispense Refill   • vancomycin (VANCOCIN) 125 MG capsule Take 1 capsule (125 mg total) by mouth 4 (four) times a day for 3 days 12 capsule 0   • hydrocortisone-pramoxine (PROCTOFOAM-HC) 1-1 % FOAM rectal foam Insert 1 applicator into the rectum 2 (two) times a day 30 g 2   • polyethylene glycol (GLYCOLAX) 17 GM/SCOOP powder At 5pm take 5mgx2 dulcolax  At 6pm 32oz miralax in 64oz gatorade  Drink 8oz glass every 5 mins until 32oz finished  Drink remaining as rec  238 g 0   • predniSONE 20 mg tablet Take 1 tablet (20 mg total) by mouth daily 30 tablet 1   • vancomycin (VANCOCIN) 125 MG capsule Take 1 capsule (125 mg total) by mouth 4 (four) times a day for 14 days 56 capsule 0     No current facility-administered medications for this visit  Allergies   Allergen Reactions   • Escitalopram Other (See Comments)       Review of Systems    Video Exam    There were no vitals filed for this visit  Physical Exam   REVIEW OF SYSTEMS:    CONSTITUTIONAL: Denies any fever, chills, rigors, and weight loss  HEENT: No earache or tinnitus  Denies hearing loss or visual disturbances  CARDIOVASCULAR: No chest pain or palpitations  RESPIRATORY: Denies any cough, hemoptysis, shortness of breath or dyspnea on exertion  GASTROINTESTINAL: As noted in the History of Present Illness  GENITOURINARY: No problems with urination  Denies any hematuria or dysuria  NEUROLOGIC: No dizziness or vertigo, denies headaches  MUSCULOSKELETAL: Denies any muscle or joint pain  SKIN: Denies skin rashes or itching  ENDOCRINE: Denies excessive thirst  Denies intolerance to heat or cold  PSYCHOSOCIAL: Denies depression or anxiety  Denies any recent memory loss  PHYSICAL EXAMINATION:  Appearance and vitals taken from home devices    General Appearance:   Alert, cooperative, no distress   HEENT:  Normocephalic, atraumatic, anicteric  Neck supple, symmetrical, trachea midline  Lungs:   Equal chest rise and unlabored breathing, normal effort, no coughing  Cardiovascular:   No visualized JVD  Abdomen:   No abdominal distension  Skin:   No jaundice, rashes, or lesions  Musculoskeletal:   Normal range of motion visualized  Psych:  Normal affect and normal insight  Neuro:  Alert and appropriate

## 2022-12-07 NOTE — TELEPHONE ENCOUNTER
Patient called today regarding she faxed Short Term Disability paper work to [phone]  Patient needs Confirmation that Paperwork was received before her Virtual Appointment Visit today at 3:00 pm  Please call patient back as soon as possible

## 2022-12-08 ENCOUNTER — TRANSCRIBE ORDERS (OUTPATIENT)
Dept: GASTROENTEROLOGY | Facility: CLINIC | Age: 32
End: 2022-12-08

## 2022-12-08 NOTE — TELEPHONE ENCOUNTER
Patient has faxed over her shorter term disability  She wants to know the turn around time for her employer to receive  She completed her portion on it  Please look into and advise, call her back  She needs to give her HR and idea of timing

## 2022-12-08 NOTE — TELEPHONE ENCOUNTER
Spoke to patient and let her know the forms are done and I will fax them over and mail her the originals

## 2022-12-13 ENCOUNTER — TELEPHONE (OUTPATIENT)
Dept: GASTROENTEROLOGY | Facility: CLINIC | Age: 32
End: 2022-12-13

## 2022-12-13 NOTE — TELEPHONE ENCOUNTER
Dr Vincent Irwin, these labs came in the mail, they are scanned into her chart  Just wanted to let you know her O&P, Giardia, Campylobacter,culture  results

## 2022-12-15 ENCOUNTER — TELEPHONE (OUTPATIENT)
Dept: GASTROENTEROLOGY | Facility: CLINIC | Age: 32
End: 2022-12-15

## 2022-12-16 ENCOUNTER — NURSE TRIAGE (OUTPATIENT)
Dept: OTHER | Facility: OTHER | Age: 32
End: 2022-12-16

## 2022-12-16 NOTE — TELEPHONE ENCOUNTER
Patient notes she has been on abx for C-diff that will finish on 12/20  Also notes she has been dealing with an ulcerative colitis flare  Reports that her nausea and vomiting has continued despite taking the antinausea medications  Also notes she is having about 10 or so bloody, mucous, watery stools daily  And still experiencing stool incontinence  Questioning if prednisone should be restarted? Please advise

## 2022-12-16 NOTE — TELEPHONE ENCOUNTER
Reason for Disposition  • Recent antibiotic therapy (i e , within last 2 months) and diarrhea present > 3 days since antibiotic was stopped    Answer Assessment - Initial Assessment Questions  1  DIARRHEA SEVERITY: "How bad is the diarrhea?" "How many extra stools have you had in the past 24 hours than normal?"     - NO DIARRHEA (SCALE 0)    - MILD (SCALE 1-3): Few loose or mushy BMs; increase of 1-3 stools over normal daily number of stools; mild increase in ostomy output  -  MODERATE (SCALE 4-7): Increase of 4-6 stools daily over normal; moderate increase in ostomy output  * SEVERE (SCALE 8-10; OR 'WORST POSSIBLE'): Increase of 7 or more stools daily over normal; moderate increase in ostomy output; incontinence  severe  2  ONSET: "When did the diarrhea begin?"       Ongoing x several months  3  BM CONSISTENCY: "How loose or watery is the diarrhea?"       watery  4  VOMITING: "Are you also vomiting?" If Yes, ask: "How many times in the past 24 hours?"       Mild-moderate vomiting  5  ABDOMINAL PAIN: "Are you having any abdominal pain?" If Yes, ask: "What does it feel like?" (e g , crampy, dull, intermittent, constant)       crampy  6  ABDOMINAL PAIN SEVERITY: If present, ask: "How bad is the pain?"  (e g , Scale 1-10; mild, moderate, or severe)    - MILD (1-3): doesn't interfere with normal activities, abdomen soft and not tender to touch     - MODERATE (4-7): interferes with normal activities or awakens from sleep, tender to touch     - SEVERE (8-10): excruciating pain, doubled over, unable to do any normal activities        Mild-moderate  7  ORAL INTAKE: If vomiting, "Have you been able to drink liquids?" "How much fluids have you had in the past 24 hours?"      Well hydarted  8  HYDRATION: "Any signs of dehydration?" (e g , dry mouth [not just dry lips], too weak to stand, dizziness, new weight loss) "When did you last urinate?"      denies  10   ANTIBIOTIC USE: "Are you taking antibiotics now or have you taken antibiotics in the past 2 months?"        Joaquin  11   OTHER SYMPTOMS: "Do you have any other symptoms?" (e g , fever, blood in stool)        Blood, mucous in stool    Protocols used: DIARRHEA-ADULT-[place]

## 2022-12-16 NOTE — TELEPHONE ENCOUNTER
Regarding: watery stool, on treatment for Cdiff  ----- Message from Honorio Ellis RN sent at 12/16/2022  3:40 PM EST -----  "I'm currently on antibiotics for Cdiff  I'm still having nausea and watery, mucous stools   I'm not sure if I need to go back on steroids?"

## 2022-12-17 ENCOUNTER — TELEPHONE (OUTPATIENT)
Dept: GASTROENTEROLOGY | Facility: CLINIC | Age: 32
End: 2022-12-17

## 2022-12-17 NOTE — TELEPHONE ENCOUNTER
I spoke to the patient regarding her active symptoms on 12/16/2022  She has been having more nausea and vomiting but it is hard to differentiate it is secondary to COVID versus active C  difficile versus flare of IBD  Likely a combination of all  She will complete her antibiotic therapy on Tuesday  If she continues to have  infection we can consider colonoscopy  with possible fecal transplantation but this can be complicated with her recent COVID  Other options would be to prolong her C  difficile therapy    She will need a virtual visit next week and plan for management based on these

## 2022-12-19 ENCOUNTER — TELEPHONE (OUTPATIENT)
Dept: GASTROENTEROLOGY | Facility: CLINIC | Age: 32
End: 2022-12-19

## 2022-12-19 NOTE — TELEPHONE ENCOUNTER
I contacted the patient to schedule virtual appointment  The pt states they would like to follow up with Dr Shewrood at [facility] office post procedure

## 2022-12-19 NOTE — TELEPHONE ENCOUNTER
----- Message from Syd Ashley MA sent at 12/19/2022  7:48 AM EST -----    ----- Message -----  From: Elva Manuel MD  Sent: 12/17/2022   2:00 PM EST  To: , #    Can we place the pt for virtual visit on Tuesday

## 2022-12-20 ENCOUNTER — PREP FOR PROCEDURE (OUTPATIENT)
Dept: GASTROENTEROLOGY | Facility: CLINIC | Age: 32
End: 2022-12-20

## 2022-12-20 ENCOUNTER — TELEPHONE (OUTPATIENT)
Dept: GASTROENTEROLOGY | Facility: CLINIC | Age: 32
End: 2022-12-20

## 2022-12-20 DIAGNOSIS — K51.018 ULCERATIVE PANCOLITIS WITH OTHER COMPLICATION (HCC): Primary | ICD-10-CM

## 2022-12-20 RX ORDER — POLYETHYLENE GLYCOL 3350, SODIUM SULFATE ANHYDROUS, SODIUM BICARBONATE, SODIUM CHLORIDE, POTASSIUM CHLORIDE 236; 22.74; 6.74; 5.86; 2.97 G/4L; G/4L; G/4L; G/4L; G/4L
4000 POWDER, FOR SOLUTION ORAL ONCE
Qty: 4000 ML | Refills: 0 | Status: SHIPPED | OUTPATIENT
Start: 2022-12-20 | End: 2022-12-22 | Stop reason: HOSPADM

## 2022-12-20 NOTE — TELEPHONE ENCOUNTER
I spoke with patient, today is day 18 of her Vanco taper  She has four more doses left to complete tomorrow morning will be her last dose  She continues with abd pain, she reports it has never gone away  She is moving her bowels at least 10 times a day, foul smelling, loose stool, she reports it being black in color upon wiping then yellow mucous like  She denies rectal bleeding  She's moved her bowels three times today  Intermittent nausea, but she feels it more related to the antibiotics  Last vomiting episode was Friday  She denies fever/chills  She is currently scheduled at our [facility] 01/10/23 at 1 pm with Dr Magnus Santana  She was supposed to have a colonoscopy this week, but Dr Matteo Sheridan wanted to extend her Vanco taper  She was then to have a virtual visit next Tuesday with Dr Matteo Sheridan  She ended up getting scheduled with Dr Magnus Santana 2/20/23; she requested a closer location as she lives in [place]  Recently tested positive for COVID during her hospitalization at [facility]  She can only be scheduled at the hospital per protocol  I discussed with Dr Magnus Santana, she needs to have a colonoscopy or flex sigmoid before the end of the year

## 2022-12-20 NOTE — TELEPHONE ENCOUNTER
Patients GI provider:  Dr Vaca Rumeño    Number to return call: [phone]    Reason for call: Pt calling statin she is currently dealing w/ C  Diff and an ulcerative colitis flare up      Scheduled procedure/appointment date if applicable: Procedure: 3/18/1644

## 2022-12-20 NOTE — TELEPHONE ENCOUNTER
I discussed with our , we have availability at [address] [facility] this Thursday 12/22 with Dr Ambika Kaur, we have time to do a colonoscopy  Dr Javier Shah I have not called [facility] yet, let me know if you want me to coordinate    Return call to patient [phone]

## 2022-12-20 NOTE — TELEPHONE ENCOUNTER
Procedure: Colonoscopy  Scheduled date of procedure (as of today):12/22/22  Physician performing procedure: Keven Flaherty  Location of procedure: [facility]  Instructions reviewed with patient by: Deborah Mcintyre   RN  Clearances: No

## 2022-12-21 ENCOUNTER — ANESTHESIA (OUTPATIENT)
Dept: ANESTHESIOLOGY | Facility: HOSPITAL | Age: 32
End: 2022-12-21

## 2022-12-21 ENCOUNTER — ANESTHESIA EVENT (OUTPATIENT)
Dept: ANESTHESIOLOGY | Facility: HOSPITAL | Age: 32
End: 2022-12-21

## 2022-12-21 ENCOUNTER — TRANSCRIBE ORDERS (OUTPATIENT)
Dept: GASTROENTEROLOGY | Facility: CLINIC | Age: 32
End: 2022-12-21

## 2022-12-22 ENCOUNTER — HOSPITAL ENCOUNTER (OUTPATIENT)
Dept: GASTROENTEROLOGY | Facility: HOSPITAL | Age: 32
Setting detail: OUTPATIENT SURGERY
End: 2022-12-22
Attending: INTERNAL MEDICINE

## 2022-12-22 ENCOUNTER — ANESTHESIA (OUTPATIENT)
Dept: GASTROENTEROLOGY | Facility: HOSPITAL | Age: 32
End: 2022-12-22

## 2022-12-22 ENCOUNTER — TELEPHONE (OUTPATIENT)
Dept: GASTROENTEROLOGY | Facility: CLINIC | Age: 32
End: 2022-12-22

## 2022-12-22 ENCOUNTER — ANESTHESIA EVENT (OUTPATIENT)
Dept: GASTROENTEROLOGY | Facility: HOSPITAL | Age: 32
End: 2022-12-22

## 2022-12-22 VITALS
TEMPERATURE: 98.3 F | RESPIRATION RATE: 16 BRPM | SYSTOLIC BLOOD PRESSURE: 112 MMHG | OXYGEN SATURATION: 100 % | DIASTOLIC BLOOD PRESSURE: 64 MMHG | HEART RATE: 88 BPM

## 2022-12-22 DIAGNOSIS — K62.5 RECTAL BLEEDING: ICD-10-CM

## 2022-12-22 DIAGNOSIS — R19.4 CHANGE IN BOWEL HABIT: ICD-10-CM

## 2022-12-22 DIAGNOSIS — K51.811 OTHER ULCERATIVE COLITIS WITH RECTAL BLEEDING (HCC): ICD-10-CM

## 2022-12-22 DIAGNOSIS — K51.018 ULCERATIVE PANCOLITIS WITH OTHER COMPLICATION (HCC): Primary | ICD-10-CM

## 2022-12-22 RX ORDER — PROPOFOL 10 MG/ML
INJECTION, EMULSION INTRAVENOUS AS NEEDED
Status: DISCONTINUED | OUTPATIENT
Start: 2022-12-22 | End: 2022-12-22

## 2022-12-22 RX ORDER — SODIUM CHLORIDE, SODIUM LACTATE, POTASSIUM CHLORIDE, CALCIUM CHLORIDE 600; 310; 30; 20 MG/100ML; MG/100ML; MG/100ML; MG/100ML
INJECTION, SOLUTION INTRAVENOUS CONTINUOUS PRN
Status: DISCONTINUED | OUTPATIENT
Start: 2022-12-22 | End: 2022-12-22

## 2022-12-22 RX ORDER — CHOLESTYRAMINE 4 G/9G
1 POWDER, FOR SUSPENSION ORAL 2 TIMES DAILY WITH MEALS
Qty: 60 PACKET | Refills: 5 | Status: SHIPPED | OUTPATIENT
Start: 2022-12-22

## 2022-12-22 RX ORDER — PREDNISONE 10 MG/1
40 TABLET ORAL DAILY
Qty: 70 TABLET | Refills: 0 | Status: SHIPPED | OUTPATIENT
Start: 2022-12-22

## 2022-12-22 RX ORDER — PROPOFOL 10 MG/ML
INJECTION, EMULSION INTRAVENOUS CONTINUOUS PRN
Status: DISCONTINUED | OUTPATIENT
Start: 2022-12-22 | End: 2022-12-22

## 2022-12-22 RX ORDER — MESALAMINE 1.2 G/1
2400 TABLET, DELAYED RELEASE ORAL
Qty: 120 TABLET | Refills: 5 | Status: SHIPPED | OUTPATIENT
Start: 2022-12-22

## 2022-12-22 RX ADMIN — PROPOFOL 100 MCG/KG/MIN: 10 INJECTION, EMULSION INTRAVENOUS at 11:25

## 2022-12-22 RX ADMIN — PROPOFOL 100 MG: 10 INJECTION, EMULSION INTRAVENOUS at 11:25

## 2022-12-22 RX ADMIN — SODIUM CHLORIDE, SODIUM LACTATE, POTASSIUM CHLORIDE, AND CALCIUM CHLORIDE: .6; .31; .03; .02 INJECTION, SOLUTION INTRAVENOUS at 10:57

## 2022-12-22 NOTE — ANESTHESIA PREPROCEDURE EVALUATION
Procedure:  PRE-OP ONLY    Relevant Problems   GI/HEPATIC   (+) Rectal bleeding      MUSCULOSKELETAL   (+) Arthritis      Digestive   (+) Ulcerative colitis (HCC)        Physical Exam    Airway    Mallampati score: II  TM Distance: >3 FB  Neck ROM: full     Dental   No notable dental hx     Cardiovascular      Pulmonary      Other Findings        Anesthesia Plan  ASA Score- 2     Anesthesia Type- IV sedation with anesthesia with ASA Monitors  Additional Monitors:   Airway Plan:           Plan Factors-    Chart reviewed  Patient summary reviewed  Patient is not a current smoker  Induction- intravenous  Postoperative Plan-     Informed Consent- Anesthetic plan and risks discussed with patient  I personally reviewed this patient with the CRNA  Discussed and agreed on the Anesthesia Plan with the CRNA  Gabriela Gomez

## 2022-12-22 NOTE — ANESTHESIA PREPROCEDURE EVALUATION
Procedure:  COLONOSCOPY    Relevant Problems   GI/HEPATIC   (+) Rectal bleeding      MUSCULOSKELETAL   (+) Arthritis        Physical Exam    Airway    Mallampati score: II  TM Distance: >3 FB  Neck ROM: full     Dental   Comment: 2 bridges on bottom,     Cardiovascular      Pulmonary      Other Findings        Anesthesia Plan  ASA Score- 2     Anesthesia Type- IV sedation with anesthesia with ASA Monitors  Additional Monitors:   Airway Plan:           Plan Factors-    Chart reviewed  Patient summary reviewed  Patient is not a current smoker  Induction- intravenous  Postoperative Plan-     Informed Consent- Anesthetic plan and risks discussed with patient  I personally reviewed this patient with the CRNA  Discussed and agreed on the Anesthesia Plan with the CRNA  Nivia Cushing

## 2022-12-22 NOTE — TELEPHONE ENCOUNTER
Called patient to schedule appointment with Dr Margoth Cota on 12/23/22  Patient will be calling back

## 2022-12-22 NOTE — H&P
History and Physical -  [facility]  Bahman Neumann 27 y o  female MRN: [id number]    HPI: Bahman Neumann is a 27y o  year old female who presents for colonoscopy to evaluate flare of ulcerative colitis and recent C  difficile infection    REVIEW OF SYSTEMS: Per the HPI, and otherwise unremarkable  Historical Information   History reviewed  No pertinent past medical history  History reviewed  No pertinent surgical history  Social History   Social History     Substance and Sexual Activity   Alcohol Use Never     Social History     Substance and Sexual Activity   Drug Use Never     Social History     Tobacco Use   Smoking Status Never   Smokeless Tobacco Never     Family History   Problem Relation Age of Onset   • Colon cancer Maternal Grandfather    • Crohn's disease Maternal Uncle        Meds/Allergies       Current Outpatient Medications:   •  polyethylene glycol (GLYCOLAX) 17 GM/SCOOP powder  •  predniSONE 20 mg tablet  •  hydrocortisone-pramoxine (PROCTOFOAM-HC) 1-1 % FOAM rectal foam  •  polyethylene glycol (Golytely) 4000 mL solution  No current facility-administered medications for this encounter  Allergies   Allergen Reactions   • Escitalopram Other (See Comments)       Objective     /64   Pulse 100   Temp 98 3 °F (36 8 °C) (Temporal)   Resp 19   SpO2 100%     PHYSICAL EXAM    Gen: NAD AAOx3  Head: Normocephalic, Atraumatic  CV: S1S2 RRR no m/r/g  CHEST: Clear b/l no c/r/w  ABD: soft, +BS NT/ND  EXT: no edema    ASSESSMENT/PLAN:  This is a 27y o  year old female here for colonoscopy, and she is stable and optimized for her procedure

## 2022-12-23 ENCOUNTER — NURSE TRIAGE (OUTPATIENT)
Dept: OTHER | Facility: OTHER | Age: 32
End: 2022-12-23

## 2022-12-23 ENCOUNTER — TELEMEDICINE (OUTPATIENT)
Dept: GASTROENTEROLOGY | Facility: CLINIC | Age: 32
End: 2022-12-23

## 2022-12-23 DIAGNOSIS — R19.4 CHANGE IN BOWEL HABIT: ICD-10-CM

## 2022-12-23 DIAGNOSIS — A04.72 C. DIFFICILE COLITIS: Primary | ICD-10-CM

## 2022-12-23 DIAGNOSIS — R11.0 NAUSEA: ICD-10-CM

## 2022-12-23 DIAGNOSIS — K62.5 RECTAL BLEEDING: ICD-10-CM

## 2022-12-23 DIAGNOSIS — K51.018 ULCERATIVE PANCOLITIS WITH OTHER COMPLICATION (HCC): ICD-10-CM

## 2022-12-23 NOTE — TELEPHONE ENCOUNTER
Spoke with patient and advised her to speak with PCP  Patient stated she is at PCP office waiting to be seen

## 2022-12-23 NOTE — TELEPHONE ENCOUNTER
Regarding: UTI  ----- Message from Edison Beth sent at 12/23/2022 11:50 AM EST -----  "I believe I have a UTI "

## 2022-12-23 NOTE — PROGRESS NOTES
Virtual Regular Visit    Verification of patient location:    Patient is located in the following state in which I hold an active license [place]      Assessment/Plan:    Problem List Items Addressed This Visit    None    1  C  difficile colitis  2  Nausea  3  Change in bowel habit  4  Rectal bleeding  5  Ulcerative pancolitis with other complication ([address] )    - continue prednisone 40 mg daily with taper for month   - nausea has improved   - can imodium as needed   - awaiting pathology   - repeat colonoscopy in 6m- 1y  -We will extend her leave from work until January 23, 2023  Persistent symptoms are ongoing which 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  We will give more time for symptoms to resolve and revert back to baseline  Reason for visit is   Chief Complaint   Patient presents with   • Virtual Regular Visit        Encounter provider Jacqueline Mecrado MD    Provider located at [address] [place] [address] 76  [phone]      Recent Visits  Date Type Provider Dept   12/22/22 Telephone [address]   12/19/22 Telephone [address]   12/17/22 Telephone Jacqueline Mercado MD [facility] recent visits within past 7 days and meeting all other requirements  Future Appointments  No visits were found meeting these conditions  Showing future appointments within next 150 days and meeting all other requirements       The patient was identified by name and date of birth  Bernardo Jurado was informed that this is a telemedicine visit and that the visit is being conducted through the [employer]  She agrees to proceed     My office door was closed  No one else was in the room  She acknowledged consent and understanding of privacy and security of the video platform   The patient has agreed to participate and understands they can discontinue the visit at any time  Patient is aware this is a billable service  Abilio Don is a 27 y o  female diarrhea is improved but has not resolved  She continues to have poor sleep secondary to this  She is doing well after a colonoscopy yesterday  Less fecal incontinence  Denies severe nausea, and BMs are improving  She is concerned regarding going back to work on January 3 as she continues to have symptoms are improving but have not completely reverted back to baseline  She also is getting worked up for UTI  Urine dipstick was negative but urine cultures were sent  No past medical history on file  No past surgical history on file  Current Outpatient Medications   Medication Sig Dispense Refill   • cholestyramine (QUESTRAN) 4 g packet Take 1 packet (4 g total) by mouth 2 (two) times a day with meals 60 packet 5   • hydrocortisone-pramoxine (PROCTOFOAM-HC) 1-1 % FOAM rectal foam Insert 1 applicator into the rectum 2 (two) times a day 30 g 2   • mesalamine (LIALDA) 1 2 g EC tablet Take 2 tablets (2 4 g total) by mouth daily with breakfast 120 tablet 5   • predniSONE 10 mg tablet Take 4 tablets (40 mg total) by mouth daily Four pills daily for 1 week, taper by 1 pill each week 70 tablet 0     No current facility-administered medications for this visit  Allergies   Allergen Reactions   • Escitalopram Other (See Comments)       Review of Systems    Video Exam    There were no vitals filed for this visit  Physical Exam     REVIEW OF SYSTEMS:    CONSTITUTIONAL: Denies any fever, chills, rigors, and weight loss  HEENT: No earache or tinnitus  Denies hearing loss or visual disturbances  CARDIOVASCULAR: No chest pain or palpitations  RESPIRATORY: Denies any cough, hemoptysis, shortness of breath or dyspnea on exertion  GASTROINTESTINAL: As noted in the History of Present Illness  GENITOURINARY: No problems with urination  Denies any hematuria or dysuria  NEUROLOGIC: No dizziness or vertigo, denies headaches  MUSCULOSKELETAL: Denies any muscle or joint pain  SKIN: Denies skin rashes or itching  ENDOCRINE: Denies excessive thirst  Denies intolerance to heat or cold  PSYCHOSOCIAL: Denies depression or anxiety  Denies any recent memory loss  PHYSICAL EXAMINATION:  Appearance and vitals taken from home devices    General Appearance:   Alert, cooperative, no distress   HEENT:  Normocephalic, atraumatic, anicteric  Neck supple, symmetrical, trachea midline  Lungs:   Equal chest rise and unlabored breathing, normal effort, no coughing  Cardiovascular:   No visualized JVD  Abdomen:   No abdominal distension  Skin:   No jaundice, rashes, or lesions  Musculoskeletal:   Normal range of motion visualized  Psych:  Normal affect and normal insight  Neuro:  Alert and appropriate

## 2022-12-23 NOTE — TELEPHONE ENCOUNTER
Patient called in to report symptoms of a UTI that started 12/21 but patient was prepping for colonoscopy 12/22 and flare of ulcerative colitis  Patient is reporting frequency, decreased urine output, and burning sensation with urination  Does not follow with urology  Patient stopped antibiotics for C  Difficile colitis on 12/20/22  Patient did call PCP who deferred patient to GI since she was recently prescribed antibiotics from them  Last visPlease follow up with patient  Answer Assessment - Initial Assessment Questions  1  SYMPTOM: "What's the main symptom you're concerned about?" (e g , frequency, incontinence)      Frequency; little urine output; burning sensation with urination  2  ONSET: "When did the symptoms start?"      12/21/22  3  PAIN: "Is there any pain?" If Yes, ask: "How bad is it?" (Scale: 1-10; mild, moderate, severe)      Mild  4  CAUSE: "What do you think is causing the symptoms?"      Possible UTI  5  OTHER SYMPTOMS: "Do you have any other symptoms?" (e g , fever, flank pain, blood in urine, pain with urination)      Denies  6   PREGNANCY: "Is there any chance you are pregnant?" "When was your last menstrual period?"      Denies    Protocols used: URINARY SYMPTOMS-ADULT-OH

## 2022-12-27 ENCOUNTER — TELEPHONE (OUTPATIENT)
Dept: OTHER | Facility: OTHER | Age: 32
End: 2022-12-27

## 2022-12-27 NOTE — TELEPHONE ENCOUNTER
Patient called today regarding Short Term Disability Paperwork  The return to work date needs to be updated  Please call Patient back to discuss

## 2022-12-28 ENCOUNTER — TELEPHONE (OUTPATIENT)
Dept: GASTROENTEROLOGY | Facility: CLINIC | Age: 32
End: 2022-12-28

## 2022-12-28 NOTE — TELEPHONE ENCOUNTER
I returned the patient's telephone call and confirmed the dates of her [facility] that Dr Her had filled out for her  I updated the dates of the paperwork and was able to fax the updated slips to [phone] and scanned fax confirmation into her chart  I advised the patient to call in if she did not receive the updated slips or there are further questions concerning this

## 2022-12-28 NOTE — TELEPHONE ENCOUNTER
I returned the patient's telephone call and confirmed the dates of [facility] that Dr Her had filled out for her  I updated the dates of the paperwork and was able to fax the updated slips to [phone] and scanned fax confirmation into her chart  I advised the patient to call in if she did not receive the updated slips or there are further questions concerning this

## 2022-12-28 NOTE — TELEPHONE ENCOUNTER
Hello    I called pt to follow up pt states original rtw date is 1-3-23 but after Telemedicine visit with Dr Ofelia Johnston he has extended date to 1-23-23     Please update forms and refax    Thank You

## 2023-01-03 NOTE — TELEPHONE ENCOUNTER
I spoke with patient, reviewed biopsy results  Dr Sheridan Fay also sent her a portal message this morning  She is requesting that her [employer] Claim Paper work be resent to fax# [phone] as her company is saying they did not receive though we have confirmation  Paper work faxed to [phone] as requested

## 2023-01-03 NOTE — RESULT ENCOUNTER NOTE
To: Nehemiah Roche    The biopsies obtained during your colonoscopy were all consistent with an active colitis flare  The polyp removed was also inflammatory, consistent with inflammatory bowel disease  There is no change in the recommendations of medication and follow-up as planned      Best regards,    Daisy Alfaro MD

## 2023-01-03 NOTE — TELEPHONE ENCOUNTER
Pt called stating fax was not received and would like to faxed again  Pt also requesting a call back to go over biopsy results

## 2023-01-05 ENCOUNTER — TELEPHONE (OUTPATIENT)
Dept: GASTROENTEROLOGY | Facility: CLINIC | Age: 33
End: 2023-01-05

## 2023-01-05 NOTE — TELEPHONE ENCOUNTER
Returned pt's phone call and re-faxed legible leave claim to [address] with updated RTW status  I also was able to mail the most recent clinical note supporting her case  I advised for pt to call back with further questions

## 2023-01-05 NOTE — TELEPHONE ENCOUNTER
Patients GI provider:  Dr Griselda Huff    Number to return call: [phone]    Reason for call: Pt calling to have her paperwork REFAXED due to not being legible when they received  Please resend legible copy and call her when completed  She states she is out of work without proper documentation      Scheduled procedure/appointment date if applicable: Apt/procedure 12/23/22

## 2023-01-13 DIAGNOSIS — K51.018 ULCERATIVE PANCOLITIS WITH OTHER COMPLICATION (HCC): ICD-10-CM

## 2023-01-16 RX ORDER — CHOLESTYRAMINE 4 G/9G
POWDER, FOR SUSPENSION ORAL
Qty: 180 PACKET | Refills: 2 | Status: SHIPPED | OUTPATIENT
Start: 2023-01-16

## 2023-01-18 DIAGNOSIS — K51.018 ULCERATIVE PANCOLITIS WITH OTHER COMPLICATION (HCC): ICD-10-CM

## 2023-01-20 RX ORDER — PREDNISONE 10 MG/1
TABLET ORAL
Qty: 70 TABLET | Refills: 0 | Status: SHIPPED | OUTPATIENT
Start: 2023-01-20 | End: 2023-01-20 | Stop reason: SDUPTHER

## 2023-02-02 ENCOUNTER — NURSE TRIAGE (OUTPATIENT)
Dept: OTHER | Facility: OTHER | Age: 33
End: 2023-02-02

## 2023-02-02 DIAGNOSIS — R19.7 DIARRHEA, UNSPECIFIED TYPE: Primary | ICD-10-CM

## 2023-02-02 DIAGNOSIS — R19.4 CHANGE IN BOWEL HABIT: ICD-10-CM

## 2023-02-02 DIAGNOSIS — A04.72 C. DIFFICILE COLITIS: Primary | ICD-10-CM

## 2023-02-02 NOTE — TELEPHONE ENCOUNTER
Patient was positive for C  Diff  In November  She had a colonoscopy that came back clear for C  Diff  Patient states that she is under a lot of stress and started with frequent liquid BM's again  She is concerned that the C  Diff is back  Reason for Disposition  • SEVERE diarrhea (e g , 7 or more times / day more than normal) and present > 24 hours (1 day)    Answer Assessment - Initial Assessment Questions  1  DIARRHEA SEVERITY:frequency 10-12 in the last 10 hours   2  ONSET: "When did the diarrhea begin?"     Liquid   3  BM CONSISTENCY: "How loose or watery is the diarrhea?"      Watery   4  VOMITING: "Are you also vomiting?" If Yes, ask: "How many times in the past 24 hours?"       Denies   5  ABDOMINAL PAIN: "Are you having any abdominal pain?" If Yes, ask: "What does it feel like?" (e g , crampy, dull, intermittent, constant)   Yes   6  ABDOMINAL PAIN SEVERITY: If present, ask: "How bad is the pain?"    7  ORAL INTAKE: If vomiting, "Have you been able to drink liquids?" "How much fluids have you had in the past 24 hours?"     6/10    10  ANTIBIOTIC USE: "Are you taking antibiotics now or have you taken antibiotics in the past 2 months?"     Took antibiotic for C  Diff  Patient was positive for C  Diff  In November  She had a colonoscopy that came back clear for C  Diff  Patient states that she is under a lot of stress and started with frequent liquid BM's again  She is concerned that the C  Diff is back      Protocols used: [id number]

## 2023-02-02 NOTE — TELEPHONE ENCOUNTER
Regarding: CDiff returning  ----- Message from [employer] sent at 2/2/2023 12:54 PM EST -----  "I tested positive for cdiff and did a course of antibiotics   My colonoscopy confirmed cdiff was gone, but I think it's coming back "

## 2023-02-02 NOTE — TELEPHONE ENCOUNTER
Telemedicine 12/23/22 Taina  FU 2/20/23 Kaela    H/O  1  C  difficile colitis  2  Nausea  3  Change in bowel habit  4  Rectal bleeding  5  Ulcerative pancolitis with other complication (HCC)    Meds  Questran 4g BID w/ meals  Mesalamine 1 2g daily with meals  Proctofoam    Spoke with pt reporting new onset of diarrhea  Due to her h/o c diff she is on high alert and expresses concern for reoccurance  Pt denies foul odor  Having 5-6 loose watery stools daily for approx 1 week  She completed a course of prednisone approx 2 weeks ago  She denies alarming s/s with this call  +BRBPR with wiping only  Recommended infectious stool studies given new onset of diarrhea, encouraged hydration and bland diet   Pt agreeable to recommendations and would like all lab slips emailed to [email or web address]

## 2023-02-02 NOTE — TELEPHONE ENCOUNTER
Lab slips emailed as per pt request due to her insurance restrictions she is not able to have them drawn in network

## 2023-02-06 LAB
C COLI+JEJUNI+LARI FUSA STL QL NAA+PROBE: NOT DETECTED
EC STX1 GENE STL QL NAA+PROBE: NOT DETECTED
EC STX2 GENE STL QL NAA+PROBE: NOT DETECTED
NOROV GI+II ORF1-ORF2 JNC STL QL NAA+PR: NOT DETECTED
RVA NSP5 STL QL NAA+PROBE: NOT DETECTED
SALMONELLA SP RPOD STL QL NAA+PROBE: NOT DETECTED
SHIGELLA DNA SPEC QL NAA+PROBE: NOT DETECTED
V CHOL+PARA RFBL+TRKH+TNAA STL QL NAA+PR: NOT DETECTED
Y ENTERO RECN STL QL NAA+PROBE: NOT DETECTED

## 2023-02-07 ENCOUNTER — TELEPHONE (OUTPATIENT)
Dept: GASTROENTEROLOGY | Facility: CLINIC | Age: 33
End: 2023-02-07

## 2023-02-07 DIAGNOSIS — A49.8 RECURRENT CLOSTRIDIOIDES DIFFICILE INFECTION: Primary | ICD-10-CM

## 2023-02-07 LAB — C DIFF TOX GENS STL QL NAA+PROBE: DETECTED

## 2023-02-07 RX ORDER — VANCOMYCIN HYDROCHLORIDE 125 MG/1
CAPSULE ORAL
Qty: 84 CAPSULE | Refills: 0 | Status: SHIPPED | OUTPATIENT
Start: 2023-02-07 | End: 2023-03-21

## 2023-02-07 NOTE — TELEPHONE ENCOUNTER
Spoke with patient  Patient addresses multiple concerns regarding recurrent C  Diff infection  Relayed results and reviewed vancomycin instructions  Relayed hygiene precautions to patient  Next OV 2/20 with Dr Cardenas Slider  1  Patient states she did not tolerate Vancomycin the last time she was treated with it for C  Diff  Patient would like to know if she could be ordered Zofran tablets or a different medication regimen  2  Patient would like to know if she should continue her mesalamine during treatment for C  Diff  3  [facility] and was wondering if this could be considered at this time

## 2023-02-07 NOTE — TELEPHONE ENCOUNTER
Patients GI provider:  Dr Misael Erwin    Number to return call: [phone]    Reason for call: Pt calling with questions about C Diff Dx and ABX called in   Please return her call asap    Scheduled procedure/appointment date if applicable: Apt/procedure 2/4/23

## 2023-02-08 NOTE — TELEPHONE ENCOUNTER
Spoke with patient  Patient is agreeable to doing an OV/virtual visit with Dr Bobo Langley or Dr Judge Samuel as soon as possible

## 2023-02-09 ENCOUNTER — TELEPHONE (OUTPATIENT)
Dept: GASTROENTEROLOGY | Facility: CLINIC | Age: 33
End: 2023-02-09

## 2023-02-09 DIAGNOSIS — A04.72 C. DIFFICILE COLITIS: Primary | ICD-10-CM

## 2023-02-09 NOTE — TELEPHONE ENCOUNTER
Discussed with patient  Still having significant diarrhea  On vancomycin day #2 of a 6-week taper  Feels lightheaded and dizzy  Minimal blood  No fevers  No abdominal pain  Has telemedicine visit with me tomorrow    Plan: Continue vancomycin  Start cholestyramine once a day  Check CBC, CMP, magnesium, phosphorus, and sed rate  Aggressive electrolyte solution intake  If becoming increasingly dehydrated and unable to match diarrheal losses would go to emergency room to get IV fluids  Continue mesalamine    Follow-up with telemedicine visit tomorrow

## 2023-02-10 ENCOUNTER — TELEMEDICINE (OUTPATIENT)
Dept: GASTROENTEROLOGY | Facility: CLINIC | Age: 33
End: 2023-02-10

## 2023-02-10 DIAGNOSIS — A04.72 C. DIFFICILE COLITIS: Primary | ICD-10-CM

## 2023-02-10 DIAGNOSIS — K51.018 ULCERATIVE PANCOLITIS WITH OTHER COMPLICATION (HCC): ICD-10-CM

## 2023-02-10 NOTE — PROGRESS NOTES
Virtual Regular Visit    Verification of patient location:    Patient is located in the following state in which I hold an active license [place]      Assessment/Plan:  1   C  difficile colitis  Patient with diarrhea  Unclear whether this is persistent/recurrent C  difficile or an ulcerative colitis flare  Patient thinks this feels more like C  difficile  On a prolonged vancomycin taper    -Continue aggressive hydration  -Continue vancomycin taper  -Blood work as ordered    2  Ulcerative colitis  Has been off medications for several years prior  On Lialda 2 4 g daily  Has been on Humira previously but had significant side effects    -Increase Lialda to 4 8 g daily  -If no better after a week of vancomycin would start prednisone  -Probably should have biologic    Patient will call next week to give me an update    Problem List Items Addressed This Visit        Digestive    Ulcerative colitis ([facility] [address] )    C  difficile colitis - Primary            Reason for visit is   Chief Complaint   Patient presents with   • c diff flare   • Virtual Regular Visit        Encounter provider Pineda Hoyt MD    Provider located at [place] [address]  [phone]      Recent Visits  Date Type Provider Dept   02/09/23 Telephone Gricel Parish MD [facility] [address] recent visits within past 7 days and meeting all other requirements  Today's Visits  Date Type Provider Dept   02/10/23 Telemedicine Gricel Parish MD [facility]   Showing today's visits and meeting all other requirements  Future Appointments  No visits were found meeting these conditions  Showing future appointments within next 150 days and meeting all other requirements       The patient was identified by name and date of birth   Kam Soria was informed that this is a telemedicine visit and that the visit is being conducted through the [employer]  She agrees to proceed     My office door was closed  No one else was in the room  She acknowledged consent and understanding of privacy and security of the video platform  The patient has agreed to participate and understands they can discontinue the visit at any time  Patient is aware this is a billable service  Kaur Valadez is a 27 y o  female with a history of ulcerative colitis who has been off medications for many years  Patient with recurrent diarrhea     Colonoscopy end of December revealed active colitis  Patient had stools are positive for C  difficile  Patient has been treated vancomycin without any current diarrhea  Repeat stool studies were positive for C  difficile  Patient started on vancomycin taper  She reports that she still having significant diarrhea particularly getting hydrated  She denies any GI bleeding  Denies any fevers or chills  Can feel her symptoms are more like significant ulcer colitis flare  Denies arthritis or problems      HPI     Past Medical History:   Diagnosis Date   • Ulcerative colitis ([facility] [address] ) 11/23/2022       No past surgical history on file  Current Outpatient Medications   Medication Sig Dispense Refill   • cholestyramine (QUESTRAN) 4 g packet TAKE 1 PACKET BY MOUTH 2 TIMES A DAY WITH MEALS  180 packet 2   • hydrocortisone-pramoxine (PROCTOFOAM-HC) 1-1 % FOAM rectal foam Insert 1 applicator into the rectum 2 (two) times a day 30 g 2   • mesalamine (LIALDA) 1 2 g EC tablet Take 2 tablets (2 4 g total) by mouth daily with breakfast 120 tablet 5   • vancomycin (VANCOCIN) 125 MG capsule Take 1 capsule (125 mg total) by mouth 4 (four) times a day for 14 days, THEN 1 capsule (125 mg total) 2 (two) times a day for 7 days, THEN 1 capsule (125 mg total) in the morning for 7 days, THEN 1 capsule (125 mg total) every other day for 14 days  84 capsule 0     No current facility-administered medications for this visit  Allergies   Allergen Reactions   • Escitalopram Other (See Comments)       Review of Systems    Video Exam    There were no vitals filed for this visit  Physical Exam   HEENT: Within normal limits  Neck: Supple  Chest: Normal effort  No wheezing  Abdomen: Nondistended  Extremity: No edema   Skin: No rashes or lesions seen  Neurologic: Alert and orient x3    Nonfocal  Psychiatric: Normal affect  I spent 25 minutes directly with the patient during this visit

## 2023-02-10 NOTE — LETTER
February 10, 2023     Elisabeth Jimenez MD  [address] [facility]    Patient: Amber Fernandez   YOB: 1990   Date of Visit: 2/10/2023       Dear Dr Brian Estevez: Thank you for referring Amber Fernandez to me for evaluation  Below are my notes for this consultation  If you have questions, please do not hesitate to call me  I look forward to following your patient along with you  Sincerely,        Clarice Gupta MD        CC: No Recipients  Clarice Gupta MD  2/10/2023  3:29 PM  Sign when Signing Visit    Virtual Regular Visit    Verification of patient location:    Patient is located in the following state in which I hold an active license [place]      Assessment/Plan:  1   C  difficile colitis  Patient with diarrhea  Unclear whether this is persistent/recurrent C  difficile or an ulcerative colitis flare  Patient thinks this feels more like C  difficile  On a prolonged vancomycin taper    -Continue aggressive hydration  -Continue vancomycin taper  -Blood work as ordered    2  Ulcerative colitis  Has been off medications for several years prior  On Lialda 2 4 g daily  Has been on Humira previously but had significant side effects    -Increase Lialda to 4 8 g daily  -If no better after a week of vancomycin would start prednisone  -Probably should have biologic    Patient will call next week to give me an update    Problem List Items Addressed This Visit        Digestive    Ulcerative colitis ([facility] [address] )    C  difficile colitis - Primary            Reason for visit is   Chief Complaint   Patient presents with   • c diff flare   • Virtual Regular Visit        Encounter provider Clarice Gupta MD    Provider located at [place] [address]  [phone]      Recent Visits  Date Type Provider Dept   02/09/23 Telephone Linda Kamara MD [facility]   Showing recent visits within past 7 days and meeting all other requirements  Today's Visits  Date Type Provider Dept   02/10/23 Telemedicine Yannick Arita MD [facility]   Showing today's visits and meeting all other requirements  Future Appointments  No visits were found meeting these conditions  Showing future appointments within next 150 days and meeting all other requirements       The patient was identified by name and date of birth  Cecilia Jean was informed that this is a telemedicine visit and that the visit is being conducted through the [employer]  She agrees to proceed     My office door was closed  No one else was in the room  She acknowledged consent and understanding of privacy and security of the video platform  The patient has agreed to participate and understands they can discontinue the visit at any time  Patient is aware this is a billable service  Orsamuel Mike is a 27 y o  female with a history of ulcerative colitis who has been off medications for many years  Patient with recurrent diarrhea     Colonoscopy end of December revealed active colitis  Patient had stools are positive for C  difficile  Patient has been treated vancomycin without any current diarrhea  Repeat stool studies were positive for C  difficile  Patient started on vancomycin taper  She reports that she still having significant diarrhea particularly getting hydrated  She denies any GI bleeding  Denies any fevers or chills  Can feel her symptoms are more like significant ulcer colitis flare  Denies arthritis or problems      HPI     Past Medical History:   Diagnosis Date   • Ulcerative colitis ([facility] [address] ) 11/23/2022       No past surgical history on file      Current Outpatient Medications   Medication Sig Dispense Refill   • cholestyramine (QUESTRAN) 4 g packet TAKE 1 PACKET BY MOUTH 2 TIMES A DAY WITH MEALS  180 packet 2   • hydrocortisone-pramoxine (PROCTOFOAM-HC) 1-1 % FOAM rectal foam Insert 1 applicator into the rectum 2 (two) times a day 30 g 2   • mesalamine (LIALDA) 1 2 g EC tablet Take 2 tablets (2 4 g total) by mouth daily with breakfast 120 tablet 5   • vancomycin (VANCOCIN) 125 MG capsule Take 1 capsule (125 mg total) by mouth 4 (four) times a day for 14 days, THEN 1 capsule (125 mg total) 2 (two) times a day for 7 days, THEN 1 capsule (125 mg total) in the morning for 7 days, THEN 1 capsule (125 mg total) every other day for 14 days  84 capsule 0     No current facility-administered medications for this visit  Allergies   Allergen Reactions   • Escitalopram Other (See Comments)       Review of Systems    Video Exam    There were no vitals filed for this visit  Physical Exam   HEENT: Within normal limits  Neck: Supple  Chest: Normal effort  No wheezing  Abdomen: Nondistended  Extremity: No edema   Skin: No rashes or lesions seen  Neurologic: Alert and orient x3    Nonfocal  Psychiatric: Normal affect  I spent 25 minutes directly with the patient during this visit

## 2023-02-12 DIAGNOSIS — K51.018 ULCERATIVE PANCOLITIS WITH OTHER COMPLICATION (HCC): ICD-10-CM

## 2023-02-13 RX ORDER — MESALAMINE 1.2 G/1
TABLET, DELAYED RELEASE ORAL
Qty: 180 TABLET | Refills: 4 | Status: SHIPPED | OUTPATIENT
Start: 2023-02-13

## 2023-02-19 LAB
ALBUMIN SERPL-MCNC: 3.2 G/DL (ref 3.6–5.1)
ALBUMIN/GLOB SERPL: 1.2 (CALC) (ref 1–2.5)
ALP SERPL-CCNC: 76 U/L (ref 31–125)
ALT SERPL-CCNC: 9 U/L (ref 6–29)
AST SERPL-CCNC: 15 U/L (ref 10–30)
BILIRUB SERPL-MCNC: 0.3 MG/DL (ref 0.2–1.2)
BUN SERPL-MCNC: 7 MG/DL (ref 7–25)
BUN/CREAT SERPL: ABNORMAL (CALC) (ref 6–22)
CALCIUM SERPL-MCNC: 8.7 MG/DL (ref 8.6–10.2)
CHLORIDE SERPL-SCNC: 104 MMOL/L (ref 98–110)
CO2 SERPL-SCNC: 25 MMOL/L (ref 20–32)
CREAT SERPL-MCNC: 0.57 MG/DL (ref 0.5–0.97)
ERYTHROCYTE [DISTWIDTH] IN BLOOD BY AUTOMATED COUNT: 13.5 % (ref 11–15)
ERYTHROCYTE [SEDIMENTATION RATE] IN BLOOD BY WESTERGREN METHOD: 2 MM/H
GFR/BSA.PRED SERPLBLD CYS-BASED-ARV: 122 ML/MIN/1.73M2
GLOBULIN SER CALC-MCNC: 2.7 G/DL (CALC) (ref 1.9–3.7)
GLUCOSE SERPL-MCNC: 81 MG/DL (ref 65–99)
HCT VFR BLD AUTO: 41.1 % (ref 35–45)
HGB BLD-MCNC: 13.5 G/DL (ref 11.7–15.5)
MCH RBC QN AUTO: 29.5 PG (ref 27–33)
MCHC RBC AUTO-ENTMCNC: 32.8 G/DL (ref 32–36)
MCV RBC AUTO: 89.9 FL (ref 80–100)
PLATELET # BLD AUTO: 755 THOUSAND/UL (ref 140–400)
PMV BLD REES-ECKER: 9.7 FL (ref 7.5–12.5)
POTASSIUM SERPL-SCNC: 4.7 MMOL/L (ref 3.5–5.3)
PROT SERPL-MCNC: 5.9 G/DL (ref 6.1–8.1)
RBC # BLD AUTO: 4.57 MILLION/UL (ref 3.8–5.1)
SODIUM SERPL-SCNC: 137 MMOL/L (ref 135–146)
WBC # BLD AUTO: 20 THOUSAND/UL (ref 3.8–10.8)

## 2023-02-20 ENCOUNTER — OFFICE VISIT (OUTPATIENT)
Dept: GASTROENTEROLOGY | Facility: CLINIC | Age: 33
End: 2023-02-20

## 2023-02-20 VITALS
DIASTOLIC BLOOD PRESSURE: 78 MMHG | BODY MASS INDEX: 22.08 KG/M2 | HEIGHT: 62 IN | WEIGHT: 120 LBS | SYSTOLIC BLOOD PRESSURE: 98 MMHG

## 2023-02-20 DIAGNOSIS — K51.018 ULCERATIVE PANCOLITIS WITH OTHER COMPLICATION (HCC): ICD-10-CM

## 2023-02-20 DIAGNOSIS — R11.0 NAUSEA: Primary | ICD-10-CM

## 2023-02-20 DIAGNOSIS — A04.72 C. DIFFICILE COLITIS: ICD-10-CM

## 2023-02-20 RX ORDER — ONDANSETRON 4 MG/1
4 TABLET, FILM COATED ORAL EVERY 8 HOURS PRN
Qty: 20 TABLET | Refills: 2 | Status: SHIPPED | OUTPATIENT
Start: 2023-02-20

## 2023-02-20 RX ORDER — PREDNISONE 10 MG/1
40 TABLET ORAL DAILY
Qty: 160 TABLET | Refills: 3 | Status: SHIPPED | OUTPATIENT
Start: 2023-02-20

## 2023-02-20 NOTE — LETTER
February 20, 2023     Juli Pandya MD  [address] [facility]    Patient: Annette Reyes   YOB: 1990   Date of Visit: 2/20/2023       Dear Dr Meghana Martinez: Thank you for referring Annette Reyes to me for evaluation  Below are my notes for this consultation  If you have questions, please do not hesitate to call me  I look forward to following your patient along with you  Sincerely,        Saeed Arriaga MD        CC: No Recipients  Saeed Arriaga MD  2/20/2023  3:14 PM  Sign when Signing Visit  [facility] - Outpatient Follow-up Note  Annette Reyes 27 y o  female MRN: [id number]  Encounter: [id number]    ASSESSMENT AND PLAN:      1  Nausea  Related to the acute colitis and the medications  - ondansetron (ZOFRAN) 4 mg tablet; Take 1 tablet (4 mg total) by mouth every 8 (eight) hours as needed for nausea or vomiting  Dispense: 20 tablet; Refill: 2    2  Ulcerative pancolitis with other complicatiions  No ulcerative colitis with medical noncompliance  Colonoscopy in November showed active disease  Intermittently taking Lialda  Initially got better on prednisone  Now with recurrent symptoms of diarrhea which she thinks is her C  difficile she has not gotten better with vancomycin  Blood work with an increased white count and a normal sed rate  -Start predniSONE 10 mg tablet; Take 4 tablets (40 mg total) by mouth daily  Dispense: 160 tablet; Refill: 3  -Continue Lialda  -Ultimately is probably going to need biologic    3  C  difficile colitis  Positive stool for C  difficile  Questionable active C  difficile versus colonization with active ulcerative colitis flare  -Continue vancomycin taper  -Cholestyramine  -If no improvement with vancomycin and prednisone with will need flexible sigmoidoscopy to assess      Followup Appointment: Call with update next week    If not getting better on vancomycin and prednisone will need flex sig to assess  Office visit 2 months  ______________________________________________________________________    Chief Complaint   Patient presents with   • having flare up      Tested positive for C-Diff two weeks ago , back on Vancomycin for C-Diff     HPI: Follow-up  She has a long history of ulcerative colitis with noncompliance with medications as well as a history of recurrent C  difficile  Her last colonoscopy was in November 2022 that showed active ulcerative colitis  She was started on Lialda and prednisone  She was also found to be C  difficile positive and treated with a course of vancomycin  She seemed to initially havef gotten better but has had recurrent diarrhea  Stool test is positive for C  difficile again  She has been started on vancomycin but has not really improved  She reports moving her bowels multiple times a day  She reports some left lower quadrant urgency  She denies any GI bleeding  She denies any fevers or chills  She reports nausea and using Zofran    She thinks this seems more like her C  difficile than ulcerative colitis    Historical Information   Past Medical History:   Diagnosis Date   • Colon polyp    • Irritable bowel syndrome    • Ulcerative colitis ([facility] [address] ) 11/23/2022     Past Surgical History:   Procedure Laterality Date   • COLONOSCOPY     • UPPER GASTROINTESTINAL ENDOSCOPY       Social History     Substance and Sexual Activity   Alcohol Use Never     Social History     Substance and Sexual Activity   Drug Use Never     Social History     Tobacco Use   Smoking Status Never   Smokeless Tobacco Never     Family History   Problem Relation Age of Onset   • Heart disease Mother    • Diabetes Mother    • Colon polyps Mother    • Diabetes Father    • Colon cancer Maternal Grandfather    • Crohn's disease Maternal Uncle          Current Outpatient Medications:   •  mesalamine (LIALDA) 1 2 g EC tablet  •  ondansetron (ZOFRAN) 4 mg tablet  •  predniSONE 10 mg tablet  •  vancomycin (VANCOCIN) 125 MG capsule  •  cholestyramine (QUESTRAN) 4 g packet  •  hydrocortisone-pramoxine (PROCTOFOAM-HC) 1-1 % FOAM rectal foam  Allergies   Allergen Reactions   • Escitalopram Other (See Comments)     Reviewed medications and allergies and updated as indicated    PHYSICAL EXAM:    Blood pressure 98/78, height 5' 2" (1 575 m), weight 54 4 kg (120 lb)  Body mass index is 21 95 kg/m²  General Appearance: NAD, cooperative, alert  Eyes: Anicteric, PERRLA, EOMI  ENT:  Normocephalic, atraumatic, normal mucosa  Neck:  Supple, symmetrical, trachea midline  Resp:  Clear to auscultation bilaterally; no rales, rhonchi or wheezing; respirations unlabored   CV:  S1 S2, Regular rate and rhythm; no murmur, rub, or gallop  GI:  Soft, minimal left lower quadrant tenderness without rebound or guarding, non-distended; normal bowel sounds; no masses, no organomegaly   Rectal: Deferred  Musculoskeletal: No cyanosis, clubbing or edema  Normal ROM  Skin:  No jaundice, rashes, or lesions   Heme/Lymph: No palpable cervical lymphadenopathy  Psych: Normal affect, good eye contact  Neuro: No gross deficits, AAOx3    Lab Results:   Lab Results   Component Value Date    WBC 20 0 (H) 02/18/2023    HGB 13 5 02/18/2023    HCT 41 1 02/18/2023    MCV 89 9 02/18/2023     (H) 02/18/2023     Lab Results   Component Value Date    K 4 7 02/18/2023     02/18/2023    CO2 25 02/18/2023    BUN 7 02/18/2023    CREATININE 0 57 02/18/2023    CALCIUM 8 7 02/18/2023    CORRECTEDCA 9 8 11/23/2022    AST 15 02/18/2023    ALT 9 02/18/2023    ALKPHOS 76 02/18/2023    EGFR 122 02/18/2023       Radiology Results:   No results found

## 2023-02-20 NOTE — PATIENT INSTRUCTIONS
Continue vancomycin taper  Start prednisone 40 mg daily  Zofran as needed for nausea  Continue Lialda 4 pills daily  Give me a phone call next week to give me an update  If you are not getting better we will plan on doing a sigmoidoscope to assess    Cholestyramine once a day to help with diarrhea

## 2023-02-20 NOTE — PROGRESS NOTES
[facility] - Outpatient Follow-up Note  Caden Presley 27 y o  female MRN: [id number]  Encounter: [id number]    ASSESSMENT AND PLAN:      1  Nausea  Related to the acute colitis and the medications  - ondansetron (ZOFRAN) 4 mg tablet; Take 1 tablet (4 mg total) by mouth every 8 (eight) hours as needed for nausea or vomiting  Dispense: 20 tablet; Refill: 2    2  Ulcerative pancolitis with other complicatiions  No ulcerative colitis with medical noncompliance  Colonoscopy in November showed active disease  Intermittently taking Lialda  Initially got better on prednisone  Now with recurrent symptoms of diarrhea which she thinks is her C  difficile she has not gotten better with vancomycin  Blood work with an increased white count and a normal sed rate  -Start predniSONE 10 mg tablet; Take 4 tablets (40 mg total) by mouth daily  Dispense: 160 tablet; Refill: 3  -Continue Lialda  -Ultimately is probably going to need biologic    3  C  difficile colitis  Positive stool for C  difficile  Questionable active C  difficile versus colonization with active ulcerative colitis flare  -Continue vancomycin taper  -Cholestyramine  -If no improvement with vancomycin and prednisone with will need flexible sigmoidoscopy to assess      Followup Appointment: Call with update next week  If not getting better on vancomycin and prednisone will need flex sig to assess  Office visit 2 months  ______________________________________________________________________    Chief Complaint   Patient presents with   • having flare up      Tested positive for C-Diff two weeks ago , back on Vancomycin for C-Diff     HPI: Follow-up  She has a long history of ulcerative colitis with noncompliance with medications as well as a history of recurrent C  difficile  Her last colonoscopy was in November 2022 that showed active ulcerative colitis  She was started on Lialda and prednisone    She was also found to be C  difficile positive and treated with a course of vancomycin  She seemed to initially havef gotten better but has had recurrent diarrhea  Stool test is positive for C  difficile again  She has been started on vancomycin but has not really improved  She reports moving her bowels multiple times a day  She reports some left lower quadrant urgency  She denies any GI bleeding  She denies any fevers or chills  She reports nausea and using Zofran  She thinks this seems more like her C  difficile than ulcerative colitis    Historical Information   Past Medical History:   Diagnosis Date   • Colon polyp    • Irritable bowel syndrome    • Ulcerative colitis ([facility] [address] ) 11/23/2022     Past Surgical History:   Procedure Laterality Date   • COLONOSCOPY     • UPPER GASTROINTESTINAL ENDOSCOPY       Social History     Substance and Sexual Activity   Alcohol Use Never     Social History     Substance and Sexual Activity   Drug Use Never     Social History     Tobacco Use   Smoking Status Never   Smokeless Tobacco Never     Family History   Problem Relation Age of Onset   • Heart disease Mother    • Diabetes Mother    • Colon polyps Mother    • Diabetes Father    • Colon cancer Maternal Grandfather    • Crohn's disease Maternal Uncle          Current Outpatient Medications:   •  mesalamine (LIALDA) 1 2 g EC tablet  •  ondansetron (ZOFRAN) 4 mg tablet  •  predniSONE 10 mg tablet  •  vancomycin (VANCOCIN) 125 MG capsule  •  cholestyramine (QUESTRAN) 4 g packet  •  hydrocortisone-pramoxine (PROCTOFOAM-HC) 1-1 % FOAM rectal foam  Allergies   Allergen Reactions   • Escitalopram Other (See Comments)     Reviewed medications and allergies and updated as indicated    PHYSICAL EXAM:    Blood pressure 98/78, height 5' 2" (1 575 m), weight 54 4 kg (120 lb)  Body mass index is 21 95 kg/m²  General Appearance: NAD, cooperative, alert  Eyes: Anicteric, PERRLA, EOMI  ENT:  Normocephalic, atraumatic, normal mucosa      Neck:  Supple, symmetrical, trachea midline  Resp:  Clear to auscultation bilaterally; no rales, rhonchi or wheezing; respirations unlabored   CV:  S1 S2, Regular rate and rhythm; no murmur, rub, or gallop  GI:  Soft, minimal left lower quadrant tenderness without rebound or guarding, non-distended; normal bowel sounds; no masses, no organomegaly   Rectal: Deferred  Musculoskeletal: No cyanosis, clubbing or edema  Normal ROM  Skin:  No jaundice, rashes, or lesions   Heme/Lymph: No palpable cervical lymphadenopathy  Psych: Normal affect, good eye contact  Neuro: No gross deficits, AAOx3    Lab Results:   Lab Results   Component Value Date    WBC 20 0 (H) 02/18/2023    HGB 13 5 02/18/2023    HCT 41 1 02/18/2023    MCV 89 9 02/18/2023     (H) 02/18/2023     Lab Results   Component Value Date    K 4 7 02/18/2023     02/18/2023    CO2 25 02/18/2023    BUN 7 02/18/2023    CREATININE 0 57 02/18/2023    CALCIUM 8 7 02/18/2023    CORRECTEDCA 9 8 11/23/2022    AST 15 02/18/2023    ALT 9 02/18/2023    ALKPHOS 76 02/18/2023    EGFR 122 02/18/2023       Radiology Results:   No results found

## 2023-04-07 DIAGNOSIS — R19.7 DIARRHEA, UNSPECIFIED TYPE: Primary | ICD-10-CM

## 2023-05-17 ENCOUNTER — OFFICE VISIT (OUTPATIENT)
Dept: GASTROENTEROLOGY | Facility: CLINIC | Age: 33
End: 2023-05-17

## 2023-05-17 VITALS
DIASTOLIC BLOOD PRESSURE: 70 MMHG | TEMPERATURE: 97.4 F | BODY MASS INDEX: 23.55 KG/M2 | HEIGHT: 62 IN | SYSTOLIC BLOOD PRESSURE: 108 MMHG | WEIGHT: 128 LBS

## 2023-05-17 DIAGNOSIS — K51.00 ULCERATIVE PANCOLITIS WITHOUT COMPLICATION (HCC): Primary | ICD-10-CM

## 2023-05-17 DIAGNOSIS — A04.71 ENTEROCOLITIS DUE TO CLOSTRIDIUM DIFFICILE, RECURRENT: ICD-10-CM

## 2023-05-17 DIAGNOSIS — A04.72 C. DIFFICILE COLITIS: ICD-10-CM

## 2023-05-17 NOTE — PROGRESS NOTES
[facility] - Outpatient Follow-up Note  Rahul Franz 26 y o  female MRN: [id number]  Encounter: [id number]          ASSESSMENT AND PLAN:      1  C  difficile colitis -Recurrent C diff infection   Recurrent C  difficile infection  Previous treatment with standard course of vancomycin as well as prolonged course of vancomycin  Currently completed Dificid but she continues to have abdominal pain and watery bowel movements  This is significantly affecting her quality of life and ADL  She is very anxious and wants to consider fecal microbiota transplant  For now I will give her another course of fidaxomicin for 10 days and consider fecal microbiota transplant  I ordered pretransplant labs for screening  Currently fecal transplant through colonoscopy is on hold per GI   I will follow-up on this and update the patient  2  Ulcerative pancolitis without complication ([facility] [address] )  We discussed that her symptoms may not be necessarily from C  difficile alone  I do not think her ulcerative colitis is well controlled  Advised her to take Lialda  Given her symptoms she will benefit from biologic treatment  She will consider this after fecal transplant  Follow-up with Dr Tenisha Vegas and myself      ______________________________________________________________________    SUBJECTIVE: 38-year-old female with moderate ulcerative colitis possibly pancolitis and recurrent C  difficile infection here for consultation for fecal microbiota transplant  UC diagnosed at age 4 @ [address]   She was on mesalamine for the most part  -On Humira 2018  for 1- 1 5 years ( adilson horrible, nausea , dry heeving   Erik Parody Erik Parody )  - Prednisone - intermittently   She was previously seen by Dr Nelsy Niño and Coni Zhao  She has been on and off using medication for many years  She continues to have chronic abdominal pain and diarrhea  Was diagnosed with C  difficile several months ago    She had positive C  "difficile testing by PCR on 2/4/2023, 4/15/2023  Initially treated with vancomycin for 10 days, then prolonged course of vancomycin after recurrence but reports having nausea and also no significant improvement while taking vancomycin    She recently completed a course of Dificid for 10 days  Now reports having multiple watery bowel movements a day  She wakes up multiple times at night to go to the bathroom                      REVIEW OF SYSTEMS IS OTHERWISE NEGATIVE  Historical Information   Past Medical History:   Diagnosis Date   • Colon polyp    • Irritable bowel syndrome    • Ulcerative colitis ([facility] [address] ) 11/23/2022     Past Surgical History:   Procedure Laterality Date   • COLONOSCOPY     • UPPER GASTROINTESTINAL ENDOSCOPY       Social History   Social History     Substance and Sexual Activity   Alcohol Use Never     Social History     Substance and Sexual Activity   Drug Use Never     Social History     Tobacco Use   Smoking Status Never   Smokeless Tobacco Never     Family History   Problem Relation Age of Onset   • Heart disease Mother    • Diabetes Mother    • Colon polyps Mother    • Diabetes Father    • Colon cancer Maternal Grandfather    • Crohn's disease Maternal Uncle        Meds/Allergies       Current Outpatient Medications:   •  mesalamine (LIALDA) 1 2 g EC tablet  •  cholestyramine (QUESTRAN) 4 g packet  •  hydrocortisone-pramoxine (PROCTOFOAM-HC) 1-1 % FOAM rectal foam  •  ondansetron (ZOFRAN) 4 mg tablet  •  predniSONE 10 mg tablet    Allergies   Allergen Reactions   • Escitalopram Other (See Comments)           Objective     Blood pressure 108/70, temperature (!) 97 4 °F (36 3 °C), temperature source Tympanic, height 5' 2\" (1 575 m), weight 58 1 kg (128 lb)  Body mass index is 23 41 kg/m²        PHYSICAL EXAM:      General Appearance:   Alert, cooperative, no distress   HEENT:   Normocephalic, atraumatic, anicteric      Neck:  Supple, symmetrical, trachea midline   Lungs:   Clear to " auscultation bilaterally; no rales, rhonchi or wheezing; respirations unlabored    Heart[de-identified]   Regular rate and rhythm; no murmur, rub, or gallop  Abdomen:   Soft, non-tender, non-distended; normal bowel sounds; no masses, no organomegaly    Genitalia:   Deferred    Rectal:   Deferred    Extremities:  No cyanosis, clubbing or edema    Pulses:  2+ and symmetric    Skin:  No jaundice, rashes, or lesions    Lymph nodes:  No palpable cervical lymphadenopathy        Lab Results:   No visits with results within 1 Day(s) from this visit  Latest known visit with results is:   Orders Only on 04/07/2023   Component Date Value   • Clostridium Difficile To* 04/15/2023 DETECTED (A)          Radiology Results:   No results found

## 2023-05-18 ENCOUNTER — TELEPHONE (OUTPATIENT)
Dept: GASTROENTEROLOGY | Facility: CLINIC | Age: 33
End: 2023-05-18

## 2023-05-18 LAB
HAV IGM SERPL QL IA: NORMAL
HBV CORE AB SERPL QL IA: NORMAL
HBV SURFACE AG SERPL QL IA: NORMAL
HCV AB S/CO SERPL IA: 0.08
HCV AB SERPL QL IA: NORMAL
HIV 1+2 AB+HIV1 P24 AG SERPL QL IA: NORMAL
RPR SER QL: NORMAL

## 2023-05-18 NOTE — TELEPHONE ENCOUNTER
Patient called in requesting a call back from Dr Francisco Arshad or the  for clarification from her doctor visit yesterday

## 2023-05-22 ENCOUNTER — TELEPHONE (OUTPATIENT)
Dept: OTHER | Facility: OTHER | Age: 33
End: 2023-05-22

## 2023-05-22 DIAGNOSIS — A04.72 C. DIFFICILE COLITIS: Primary | ICD-10-CM

## 2023-05-22 NOTE — TELEPHONE ENCOUNTER
Patient is requested a referral for a stool transplant with [facility] in [place], [place]  Please fax referral to Fax # [phone]    Patient would like a copy of the referral

## 2023-05-22 NOTE — TELEPHONE ENCOUNTER
Spoke with pt to provide referral information  There was difficulty placing the referral at first but eventually referral seems to have been placed for GI provider at [facility]  I have also sent referral info to pt via [facility]    Pt expressed frustration with not having FMT or made aware in a timely manner  Pt feels that given she has been actively waiting to have FMT and waited to have OV with Dr Trish De León and was supposed to have the procedure; she should have been accommodated  Pt wants to speak with clinical manager  Pt expressed dissatisfaction that there are no answers or accommodations for her  I validated pt feelings and attempted to provide any advice I was able to given my scope of practice  I apologized for pts experience  Pt feels more should have been done and stated that she feels 'things are up in the air and everyone is just saying 'sorry''  I explained my role in the practice and my role with FMTs, I again apologized and explained that at this point I can offer an apology, have management reach out,  and provide referral  Pt verbalized understanding

## 2023-05-23 NOTE — TELEPHONE ENCOUNTER
Patient called that she needs the referral that was sent yesterday to be fax to a [address] [place] [address] fax# [phone]  The office will not accept the referral from patient she is aware it is in her chart

## 2023-06-14 ENCOUNTER — TRANSCRIBE ORDERS (OUTPATIENT)
Dept: GASTROENTEROLOGY | Facility: CLINIC | Age: 33
End: 2023-06-14

## 2023-06-23 ENCOUNTER — NURSE TRIAGE (OUTPATIENT)
Age: 33
End: 2023-06-23

## 2023-06-23 DIAGNOSIS — A04.72 C. DIFFICILE COLITIS: Primary | ICD-10-CM

## 2023-06-23 NOTE — TELEPHONE ENCOUNTER
Reason for Disposition  • Nursing judgment    Answer Assessment - Initial Assessment Questions  Patient called in wanting to know what is the next step in her treatment plan  She advised she was supposed to hear from a manager last Friday; it has been a week now and she still has not heard from anyone  She has a hx: recurrent c diff  Her bowels have not been normal since last October '22  She was last seen by Dr Nadya Singleton 5/17/23, prescribed a 10 day course of DIFICID  Completed this  She was wanting to pursue FMT, but this is not an option at this time  She is agreeable to proceed with Capsule alternative (has been communicating via the portal), but again has not heard from anyone regarding next step  She continues with 10 liquid stools a day, she is now noticing a metallic odor associated with her stool that started several days ago  She denies fevers  She reports abd cramping/bloating associated with diarrhea    Patient can be reached at [phone]    Protocols used: INFORMATION ONLY CALL - NO TRIAGE-ADULT-[place]

## 2023-07-01 LAB — C DIFF TOX GENS STL QL NAA+PROBE: NOT DETECTED

## 2023-07-06 ENCOUNTER — TELEPHONE (OUTPATIENT)
Dept: GASTROENTEROLOGY | Facility: CLINIC | Age: 33
End: 2023-07-06

## 2023-07-06 NOTE — TELEPHONE ENCOUNTER
Scheduled date of FMT (as of today):08/24/2023  Physician performing :Loretta  Location of:[facility]  Desired bowel prep reviewed with patient:miralalx  Instructions reviewed with patient by:[place]-mailed out 7/6  Clearances:  n/a

## 2023-07-11 NOTE — TELEPHONE ENCOUNTER
Received call from [facility] regarding upcoming colonoscopy with fecal transplant scheduled for 8/24/23. Patient made aware of instruction package mailed out 7/6/23. Patient has questions for provider prior to procedure. She is currently taking Prednisone 20 mg daily for colitis flare and needs to know when to stop prior to procedure if her symptoms have not improved. She has other questions and concerns regarding procedure for provider. Patient is mary looking for follow up from financial cooddinator for upcoming procedure to make sure it is covered by her insurance. Please follow up with patient.

## 2023-07-18 ENCOUNTER — TELEPHONE (OUTPATIENT)
Dept: GASTROENTEROLOGY | Facility: CLINIC | Age: 33
End: 2023-07-18

## 2023-07-18 NOTE — TELEPHONE ENCOUNTER
----- Message from Rodrigue Valdes sent at 7/18/2023 11:32 AM EDT -----  Regarding: RE: fmt question  Jerald Mallory    Please let patient know I called [facility] and spoke to Yadi Owens who said this would fall under medical and it is covered under patients plan and will go towards her deductible and out of pocket if those have not been met yet. Its all based off of medical necessity for payment and prior auth is required so I started a case for that. She said prior auth could take 10-15 days for an answer but sometimes shorter. Thank you!    ----- Message -----  From: Binh Rivera MA  Sent: 7/13/2023   3:59 PM EDT  To: Rodrigue Valdes  Subject: RE: [facility] no rush. Thanks for looking into this.  ----- Message -----  From: Rodrigue Valdes  Sent: 7/13/2023   3:28 PM EDT  To: Binh Rivera MA  Subject: RE: fmt question                                 Pat Burton, since theres still over a month till patients appointment and I haven't gotten to August yet but I will try my best to get to this tomorrow and call the insurance and find out. Ill keep you posted. ----- Message -----  From: Binh Rivera MA  Sent: 7/13/2023  12:06 PM EDT  To: Rodrigue Valdes  Subject: fmt question                                     Pat Burton,  This patient was concerned about the cost of her FMT. She was asking if because she had a prior colonoscopy in dec 2022 will there be a problem with it being covered since the fmt is also a colonoscopy.

## 2023-08-14 ENCOUNTER — TELEPHONE (OUTPATIENT)
Age: 33
End: 2023-08-14

## 2023-08-14 NOTE — TELEPHONE ENCOUNTER
Patients GI provider:  Dr. Will Cue    Number to return call: [phone]    Reason for call: Pt calling To ask if everything she needs to have done for her FMT is completed.     Scheduled procedure/appointment date if applicable: Apt/procedure 8/24/23

## 2023-08-24 ENCOUNTER — HOSPITAL ENCOUNTER (OUTPATIENT)
Dept: GASTROENTEROLOGY | Facility: HOSPITAL | Age: 33
Setting detail: OUTPATIENT SURGERY
End: 2023-08-24
Attending: INTERNAL MEDICINE
Payer: COMMERCIAL

## 2023-08-24 ENCOUNTER — ANESTHESIA EVENT (OUTPATIENT)
Dept: GASTROENTEROLOGY | Facility: HOSPITAL | Age: 33
End: 2023-08-24

## 2023-08-24 ENCOUNTER — ANESTHESIA (OUTPATIENT)
Dept: GASTROENTEROLOGY | Facility: HOSPITAL | Age: 33
End: 2023-08-24

## 2023-08-24 VITALS
WEIGHT: 135 LBS | RESPIRATION RATE: 16 BRPM | BODY MASS INDEX: 24.84 KG/M2 | HEIGHT: 62 IN | OXYGEN SATURATION: 98 % | SYSTOLIC BLOOD PRESSURE: 111 MMHG | HEART RATE: 93 BPM | TEMPERATURE: 96.2 F | DIASTOLIC BLOOD PRESSURE: 66 MMHG

## 2023-08-24 DIAGNOSIS — A04.71 ENTEROCOLITIS DUE TO CLOSTRIDIUM DIFFICILE, RECURRENT: ICD-10-CM

## 2023-08-24 DIAGNOSIS — K51.00 ULCERATIVE PANCOLITIS WITHOUT COMPLICATION (HCC): Primary | ICD-10-CM

## 2023-08-24 LAB
EXT PREGNANCY TEST URINE: NEGATIVE
EXT. CONTROL: NORMAL

## 2023-08-24 PROCEDURE — 81025 URINE PREGNANCY TEST: CPT | Performed by: GENERAL PRACTICE

## 2023-08-24 PROCEDURE — 88305 TISSUE EXAM BY PATHOLOGIST: CPT | Performed by: PATHOLOGY

## 2023-08-24 PROCEDURE — 87252 VIRUS INOCULATION TISSUE: CPT | Performed by: INTERNAL MEDICINE

## 2023-08-24 PROCEDURE — 88342 IMHCHEM/IMCYTCHM 1ST ANTB: CPT | Performed by: PATHOLOGY

## 2023-08-24 RX ORDER — LIDOCAINE HYDROCHLORIDE 10 MG/ML
INJECTION, SOLUTION EPIDURAL; INFILTRATION; INTRACAUDAL; PERINEURAL AS NEEDED
Status: DISCONTINUED | OUTPATIENT
Start: 2023-08-24 | End: 2023-08-24

## 2023-08-24 RX ORDER — SODIUM CHLORIDE 9 MG/ML
INJECTION, SOLUTION INTRAVENOUS CONTINUOUS PRN
Status: DISCONTINUED | OUTPATIENT
Start: 2023-08-24 | End: 2023-08-24

## 2023-08-24 RX ORDER — PROPOFOL 10 MG/ML
INJECTION, EMULSION INTRAVENOUS AS NEEDED
Status: DISCONTINUED | OUTPATIENT
Start: 2023-08-24 | End: 2023-08-24

## 2023-08-24 RX ADMIN — SODIUM CHLORIDE: 0.9 INJECTION, SOLUTION INTRAVENOUS at 15:45

## 2023-08-24 RX ADMIN — LIDOCAINE HYDROCHLORIDE 25 MG: 10 INJECTION, SOLUTION EPIDURAL; INFILTRATION; INTRACAUDAL; PERINEURAL at 15:48

## 2023-08-24 RX ADMIN — PROPOFOL 120 MCG/KG/MIN: 10 INJECTION, EMULSION INTRAVENOUS at 15:50

## 2023-08-24 RX ADMIN — PROPOFOL 120 MG: 10 INJECTION, EMULSION INTRAVENOUS at 15:49

## 2023-08-24 NOTE — H&P
[facility]  History & Physical    Patient Info:  Name: Hiro Celis   Age: 26 y.o. YOB: 1990   MRN: [id number]    HPI: Hiro Celis is a 26y.o. year old female who presents for colonoscopy with FMT for persistent CDiff infection despite multiple courses of antibiotics. Patient also with history of UC, and significantly symptomatic. Procedure being done in attempt to clear CDiff component of diarrhea and abdominal pain with plan to proceed with UC treatment post FMT. REVIEW OF SYSTEMS: Per the HPI, and otherwise unremarkable. Historical Information   Past Medical History:   Diagnosis Date   • Colon polyp    • Irritable bowel syndrome    • Ulcerative colitis ([address] ) 11/23/2022     Past Surgical History:   Procedure Laterality Date   • COLONOSCOPY     • UPPER GASTROINTESTINAL ENDOSCOPY       Social History   Social History     Substance and Sexual Activity   Alcohol Use Never     Social History     Substance and Sexual Activity   Drug Use Never     Social History     Tobacco Use   Smoking Status Never   Smokeless Tobacco Never     Family History   Problem Relation Age of Onset   • Heart disease Mother    • Diabetes Mother    • Colon polyps Mother    • Diabetes Father    • Colon cancer Maternal Grandfather    • Crohn's disease Maternal Uncle        MEDICATIONS & ALLERGIES:    Current Outpatient Medications   Medication Instructions   • cholestyramine (QUESTRAN) 4 g packet TAKE 1 PACKET BY MOUTH 2 TIMES A DAY WITH MEALS. • hydrocortisone-pramoxine (PROCTOFOAM-HC) 1-1 % FOAM rectal foam 1 applicator, Rectal, 2 times daily   • mesalamine (LIALDA) 1.2 g EC tablet TAKE 2 TABLETS BY MOUTH DAILY WITH BREAKFAST.    • ondansetron (ZOFRAN) 4 mg, Oral, Every 8 hours PRN   • predniSONE 40 mg, Oral, Daily     Allergies   Allergen Reactions   • Escitalopram Other (See Comments)       PHYSICAL EXAM:    Objective   Blood pressure 106/65, pulse 95, temperature 98.2 °F (36.8 °C), temperature source Tympanic, resp. rate 18, height 5' 2" (1.575 m), weight 61.2 kg (135 lb), last menstrual period 08/06/2023, SpO2 98 %. Body mass index is 24.69 kg/m². Gen: NAD  CV: RRR  CHEST: Clear  ABD: Soft, NT/ND  EXT: No edema    ASSESSMENT AND PLAN:  This is a 26y.o. year old female here for colonoscopy with FMT, and she is stable and optimized for her procedure. Nannette Batista D.O. Gastroenterology Fellow  [facility]  Division of Gastroenterology & Hepatology  Available on [employer]    ** Please Note: This note is constructed using a voice recognition dictation system.  **

## 2023-08-24 NOTE — ANESTHESIA POSTPROCEDURE EVALUATION
Post-Op Assessment Note    CV Status:  Stable  Pain Score: 0    Pain management: adequate  Multimodal analgesia used between 6 hours prior to anesthesia start to PACU discharge    Mental Status:  Alert and awake   Hydration Status:  Euvolemic and stable   PONV Controlled:  Controlled   Airway Patency:  Patent   Two or more mitigation strategies used for obstructive sleep apnea   Post Op Vitals Reviewed: Yes      Staff: CRNA         No notable events documented.     BP   85/53   Temp   96.7   Pulse  56   Resp   12   SpO2   98

## 2023-08-24 NOTE — ANESTHESIA POSTPROCEDURE EVALUATION
Post-Op Assessment Note    CV Status:  Stable    Pain management: adequate     Hydration Status:  Stable   Airway Patency:  Patent      Post Op Vitals Reviewed: Yes      Staff: Anesthesiologist         No notable events documented.     BP   85/51       Pulse  102   Resp   14   SpO2   95

## 2023-08-24 NOTE — ANESTHESIA PREPROCEDURE EVALUATION
Procedure:  COLONOSCOPY    Relevant Problems   GI/HEPATIC   (+) Rectal bleeding      MUSCULOSKELETAL   (+) Arthritis      Digestive   (+) C. difficile colitis        Physical Exam    Airway    Mallampati score: I  TM Distance: >3 FB  Neck ROM: full     Dental   No notable dental hx     Cardiovascular      Pulmonary      Other Findings        Anesthesia Plan  ASA Score- 2     Anesthesia Type- IV sedation with anesthesia with ASA Monitors. Additional Monitors:   Airway Plan:           Plan Factors-Exercise tolerance (METS): >4 METS. Chart reviewed. Patient summary reviewed. Induction- intravenous. Postoperative Plan-     Informed Consent- Anesthetic plan and risks discussed with patient. I personally reviewed this patient with the CRNA. Discussed and agreed on the Anesthesia Plan with the CRNA. Brigitte Lai

## 2023-08-25 ENCOUNTER — NURSE TRIAGE (OUTPATIENT)
Age: 33
End: 2023-08-25

## 2023-08-25 DIAGNOSIS — K51.011 ULCERATIVE PANCOLITIS WITH RECTAL BLEEDING (HCC): Primary | ICD-10-CM

## 2023-08-25 RX ORDER — PREDNISONE 10 MG/1
TABLET ORAL
Qty: 100 TABLET | Refills: 0 | Status: SHIPPED | OUTPATIENT
Start: 2023-08-25

## 2023-08-25 NOTE — TELEPHONE ENCOUNTER
Please advise     Patient calling in, she would like to know since her colonoscopy showed severe pancolitis yesterday if she is to go back on prednisone until she can be seen with Dr. Travis Oliva? If so she would need a new script called into her pharmacy. I did schedule patient to see Dr. Travis Oliva- soonest available urgent appt was 9/22/23 in [facility].

## 2023-08-28 ENCOUNTER — TELEPHONE (OUTPATIENT)
Dept: GASTROENTEROLOGY | Facility: CLINIC | Age: 33
End: 2023-08-28

## 2023-08-28 NOTE — TELEPHONE ENCOUNTER
Spoke to patient - agreeable to 9/5 virtual @ 5pm with Dr Remedios Fischer  May need to change to 9/6 after clinic.
90

## 2023-08-30 PROCEDURE — 88342 IMHCHEM/IMCYTCHM 1ST ANTB: CPT | Performed by: PATHOLOGY

## 2023-08-30 PROCEDURE — 88305 TISSUE EXAM BY PATHOLOGIST: CPT | Performed by: PATHOLOGY

## 2023-09-05 ENCOUNTER — TELEPHONE (OUTPATIENT)
Dept: GASTROENTEROLOGY | Facility: CLINIC | Age: 33
End: 2023-09-05

## 2023-09-06 ENCOUNTER — TELEPHONE (OUTPATIENT)
Age: 33
End: 2023-09-06

## 2023-09-19 DIAGNOSIS — R11.0 NAUSEA: ICD-10-CM

## 2023-09-19 RX ORDER — ONDANSETRON 4 MG/1
4 TABLET, FILM COATED ORAL EVERY 8 HOURS PRN
Qty: 20 TABLET | Refills: 0 | Status: SHIPPED | OUTPATIENT
Start: 2023-09-19

## 2023-09-26 ENCOUNTER — TELEPHONE (OUTPATIENT)
Dept: GASTROENTEROLOGY | Facility: CLINIC | Age: 33
End: 2023-09-26

## 2023-09-26 DIAGNOSIS — E86.0 DEHYDRATION: Primary | ICD-10-CM

## 2023-09-26 NOTE — TELEPHONE ENCOUNTER
Spoke to pt that she is approved for her Stelara infusion, but she has many questions about stelara, the infusion, difference between cdiff and colits. She needs to speak to someone about all this. She is concerned about moving forward if she could have cdiff again. She is also asking if she should be tested for cdiff again, I figured you would shoot message to dr Aaron Garces about that.

## 2023-09-27 NOTE — TELEPHONE ENCOUNTER
I spoke with the patient. All questions answered. Encourage patient to sign up for [facility] nurse navigator. Dr. Lashawn Freedman- pt requesting weekly IV hydration at  infusion center and if additional fluids/vitamins could be added during Stelara infusion on 10/3.

## 2023-09-28 PROBLEM — E86.0 DEHYDRATION: Status: ACTIVE | Noted: 2023-09-28

## 2023-09-28 NOTE — TELEPHONE ENCOUNTER
I placed the orders but can you clarify why she needs IV hydration and can not take oral hydration?  Also, I would not add vitamins unless we have documented deficiencies (it is not recommended to routinely give vitamin supplementation)     Thank you

## 2023-09-29 ENCOUNTER — PATIENT MESSAGE (OUTPATIENT)
Dept: GASTROENTEROLOGY | Facility: CLINIC | Age: 33
End: 2023-09-29

## 2023-09-29 DIAGNOSIS — K51.011 ULCERATIVE PANCOLITIS WITH RECTAL BLEEDING (HCC): Primary | ICD-10-CM

## 2023-09-29 NOTE — TELEPHONE ENCOUNTER
Pt called for an update on her request for the hydration and vitamins for her infusion on 10.03.23. I read the notes and was able to advise her and she understands. She has two additional things; She wants to make sure she is ok to be taking her prednisone while getting the IV? And she wants to know if she can be tested for Cdiff again. She fells like she is having some of the symptoms and she would rather know for sure. Please call to advise when possible.   [phone]

## 2023-10-03 ENCOUNTER — HOSPITAL ENCOUNTER (OUTPATIENT)
Dept: INFUSION CENTER | Facility: HOSPITAL | Age: 33
Discharge: HOME | End: 2023-10-03
Attending: INTERNAL MEDICINE
Payer: COMMERCIAL

## 2023-10-03 VITALS
RESPIRATION RATE: 16 BRPM | TEMPERATURE: 97.4 F | SYSTOLIC BLOOD PRESSURE: 105 MMHG | DIASTOLIC BLOOD PRESSURE: 52 MMHG | HEART RATE: 102 BPM | WEIGHT: 136.91 LBS | BODY MASS INDEX: 25.04 KG/M2 | OXYGEN SATURATION: 97 %

## 2023-10-03 DIAGNOSIS — K51.00 ULCERATIVE PANCOLITIS WITHOUT COMPLICATION (HCC): Primary | ICD-10-CM

## 2023-10-03 DIAGNOSIS — E86.0 DEHYDRATION: ICD-10-CM

## 2023-10-03 PROCEDURE — 96365 THER/PROPH/DIAG IV INF INIT: CPT

## 2023-10-03 PROCEDURE — 96361 HYDRATE IV INFUSION ADD-ON: CPT

## 2023-10-03 RX ORDER — SODIUM CHLORIDE 9 MG/ML
20 INJECTION, SOLUTION INTRAVENOUS ONCE
Status: CANCELLED | OUTPATIENT
Start: 2023-10-03

## 2023-10-03 RX ORDER — HYDROCORTISONE 25 MG/G
CREAM TOPICAL 2 TIMES DAILY
Qty: 56 G | Refills: 0 | Status: SHIPPED | OUTPATIENT
Start: 2023-10-03

## 2023-10-03 RX ORDER — SODIUM CHLORIDE 9 MG/ML
20 INJECTION, SOLUTION INTRAVENOUS ONCE
Status: COMPLETED | OUTPATIENT
Start: 2023-10-03 | End: 2023-10-03

## 2023-10-03 RX ADMIN — SODIUM CHLORIDE 1000 ML: 0.9 INJECTION, SOLUTION INTRAVENOUS at 14:30

## 2023-10-03 RX ADMIN — SODIUM CHLORIDE 20 ML/HR: 0.9 INJECTION, SOLUTION INTRAVENOUS at 14:30

## 2023-10-03 RX ADMIN — USTEKINUMAB 390 MG: 130 SOLUTION INTRAVENOUS at 15:36

## 2023-10-03 NOTE — PROGRESS NOTES
Pt tolerated Stelara infusion without complication. Pt scheduled next hydration appt. PIV removed. AVS received. Pt left ambulatory for d/c.

## 2023-10-03 NOTE — PROGRESS NOTES
Patient in infusion center today for day 1 of stelara infusion and hydration. Patient does have negative Hepatitis panel on her chart. Hydration started per order. Pt has not had TB testing completed. Shannan Andrade MA with Dr. Almas Oliver office notified. Per [address] the IBD coordinator - the TB testing completed within the past year is OK to use to treatment today.   And

## 2023-10-04 DIAGNOSIS — K51.011 ULCERATIVE PANCOLITIS WITH RECTAL BLEEDING (HCC): Primary | ICD-10-CM

## 2023-10-04 RX ORDER — BUDESONIDE 3 MG/1
9 CAPSULE, COATED PELLETS ORAL DAILY
Qty: 90 CAPSULE | Refills: 0 | Status: SHIPPED | OUTPATIENT
Start: 2023-10-04

## 2023-10-04 NOTE — TELEPHONE ENCOUNTER
----- Message from Mariah Dhillon MD sent at 10/3/2023 12:52 PM EDT -----  Regarding: FW: Yvette script  Contact: [phone]  Hi,    I can send in some steroid cream. Can we suggest starting Turmeric + Leila-lauren ([facility])? Also, we can wait and see how she feels on the Stelara. We can also do a short course of budesonide (which is a week steroid and can help). Thank you!  ----- Message -----  From: Tim Becerra RN  Sent: 10/3/2023   9:03 AM EDT  To: Mariah Dhillon MD  Subject: FW: Yvette lira                                 Today she gets her Stelara infusion but other advice? Also can you order prescription cream. Thank you!  ----- Message -----  From: Paty Maurer  Sent: 10/3/2023   8:50 AM EDT  To: Travis Bahena  Subject: FW: Yvette script                                   ----- Message -----  From: Devi Santos  Sent: 10/2/2023   9:12 PM EDT  To: Gastroenterology Pod Clinical  Subject: Yvette script                                     Hi Krista,     I saw the negative test result- yay! I have really bad stomach pains, liquid diarrhea 15-20 times a day and I’m having accidents. My bottom is raw and has sores from going to the bathroom so much. I am using triple paste but it’s not helping. Can  call in a prescription cream?     I would say my overall, I feel awful. Very dehydrated too. Fingers crossed the infusion heals me!      Thanks,  [employer]

## 2023-10-10 ENCOUNTER — HOSPITAL ENCOUNTER (OUTPATIENT)
Dept: INFUSION CENTER | Facility: HOSPITAL | Age: 33
End: 2023-10-10
Attending: INTERNAL MEDICINE

## 2023-10-26 DIAGNOSIS — K51.011 ULCERATIVE PANCOLITIS WITH RECTAL BLEEDING (HCC): ICD-10-CM

## 2023-10-26 RX ORDER — BUDESONIDE 3 MG/1
9 CAPSULE, COATED PELLETS ORAL DAILY
Qty: 270 CAPSULE | Refills: 1 | Status: SHIPPED | OUTPATIENT
Start: 2023-10-26

## 2023-12-12 DIAGNOSIS — E86.0 DEHYDRATION: ICD-10-CM

## 2023-12-12 DIAGNOSIS — R19.7 DIARRHEA, UNSPECIFIED TYPE: ICD-10-CM

## 2023-12-12 DIAGNOSIS — K51.011 ULCERATIVE PANCOLITIS WITH RECTAL BLEEDING (HCC): Primary | ICD-10-CM

## 2023-12-22 LAB
ALBUMIN SERPL-MCNC: 3.9 G/DL (ref 3.6–5.1)
ALBUMIN/GLOB SERPL: 1.4 (CALC) (ref 1–2.5)
ALP SERPL-CCNC: 85 U/L (ref 31–125)
ALT SERPL-CCNC: 12 U/L (ref 6–29)
AST SERPL-CCNC: 16 U/L (ref 10–30)
BASOPHILS # BLD AUTO: 135 CELLS/UL (ref 0–200)
BASOPHILS NFR BLD AUTO: 1 %
BILIRUB SERPL-MCNC: 0.2 MG/DL (ref 0.2–1.2)
BUN SERPL-MCNC: 7 MG/DL (ref 7–25)
BUN/CREAT SERPL: NORMAL (CALC) (ref 6–22)
C DIFF TOX GENS STL QL NAA+PROBE: NOT DETECTED
CALCIUM SERPL-MCNC: 9 MG/DL (ref 8.6–10.2)
CHLORIDE SERPL-SCNC: 105 MMOL/L (ref 98–110)
CO2 SERPL-SCNC: 27 MMOL/L (ref 20–32)
CREAT SERPL-MCNC: 0.63 MG/DL (ref 0.5–0.97)
CRP SERPL-MCNC: 4.7 MG/L
EOSINOPHIL # BLD AUTO: 581 CELLS/UL (ref 15–500)
EOSINOPHIL NFR BLD AUTO: 4.3 %
ERYTHROCYTE [DISTWIDTH] IN BLOOD BY AUTOMATED COUNT: 13.2 % (ref 11–15)
GFR/BSA.PRED SERPLBLD CYS-BASED-ARV: 119 ML/MIN/1.73M2
GLOBULIN SER CALC-MCNC: 2.8 G/DL (CALC) (ref 1.9–3.7)
GLUCOSE SERPL-MCNC: 84 MG/DL (ref 65–139)
HCT VFR BLD AUTO: 35.8 % (ref 35–45)
HGB BLD-MCNC: 11.8 G/DL (ref 11.7–15.5)
LYMPHOCYTES # BLD AUTO: 3929 CELLS/UL (ref 850–3900)
LYMPHOCYTES NFR BLD AUTO: 29.1 %
MCH RBC QN AUTO: 29.9 PG (ref 27–33)
MCHC RBC AUTO-ENTMCNC: 33 G/DL (ref 32–36)
MCV RBC AUTO: 90.9 FL (ref 80–100)
MONOCYTES # BLD AUTO: 716 CELLS/UL (ref 200–950)
MONOCYTES NFR BLD AUTO: 5.3 %
NEUTROPHILS # BLD AUTO: 8141 CELLS/UL (ref 1500–7800)
NEUTROPHILS NFR BLD AUTO: 60.3 %
PLATELET # BLD AUTO: 579 THOUSAND/UL (ref 140–400)
PMV BLD REES-ECKER: 10 FL (ref 7.5–12.5)
POTASSIUM SERPL-SCNC: 4.3 MMOL/L (ref 3.5–5.3)
PROT SERPL-MCNC: 6.7 G/DL (ref 6.1–8.1)
RBC # BLD AUTO: 3.94 MILLION/UL (ref 3.8–5.1)
SODIUM SERPL-SCNC: 137 MMOL/L (ref 135–146)
USTEKINUMAB AB SERPL IA-MCNC: <10 AU/ML
USTEKINUMAB SERPL IA-MCNC: 9.4 MCG/ML
WBC # BLD AUTO: 13.5 THOUSAND/UL (ref 3.8–10.8)

## 2023-12-27 LAB — ELASTASE PANC STL-MCNT: >500 MCG/G

## 2023-12-28 LAB
C DIFF TOX GENS STL QL NAA+PROBE: NOT DETECTED
CALPROTECTIN STL-MCNT: 1910 MCG/G

## 2023-12-29 DIAGNOSIS — K51.011 ULCERATIVE PANCOLITIS WITH RECTAL BLEEDING (HCC): Primary | ICD-10-CM

## 2024-01-17 ENCOUNTER — TELEPHONE (OUTPATIENT)
Age: 34
End: 2024-01-17

## 2024-01-17 NOTE — TELEPHONE ENCOUNTER
Patients GI provider:  Dr. Burgos    Number to return call: [phone]    Reason for call: Pt calling in regards to bill received for Stelara infusion from Oct 2023. Referred pt to billing dept.    Scheduled procedure/appointment date if applicable: N/A

## 2024-01-19 ENCOUNTER — TELEPHONE (OUTPATIENT)
Dept: GASTROENTEROLOGY | Facility: CLINIC | Age: 34
End: 2024-01-19

## 2024-01-19 NOTE — TELEPHONE ENCOUNTER
----- Message from Alisalynda Landerosy sent at 1/17/2024  8:39 AM EST -----  Regarding: FW: Lawrenceandra  Contact: [phone]    ----- Message -----  From: Bonnie Remy  Sent: 1/16/2024   6:57 PM EST  To: Gastroenterology Pod Clinical  Subject: Steandra Mckeon,    I submitted the EOB and the bill from [facility] to [facility] but it is denied because it does not state the name of the medication on the bill. It only says “infusion therapy.” Mike told me to reach out to the doctor’s office and ask for an itemized bill with the J-code or name of medication. Are you able to provide that? If not, can I have a doctor’s note stating what medication I am on. This is really frustrating and I’ve been fighting with them for months now.     Thank you,  Bonnie Remy

## 2024-02-05 DIAGNOSIS — K51.011 ULCERATIVE PANCOLITIS WITH RECTAL BLEEDING (HCC): ICD-10-CM

## 2024-02-05 RX ORDER — BUDESONIDE 3 MG/1
9 CAPSULE, COATED PELLETS ORAL DAILY
Qty: 90 CAPSULE | Refills: 1 | Status: SHIPPED | OUTPATIENT
Start: 2024-02-05

## 2024-02-05 NOTE — TELEPHONE ENCOUNTER
----- Message from Alisalynda Landerosy sent at 2/5/2024 11:52 AM EST -----  Regarding: FW: Stool test  Contact: [phone]    ----- Message -----  From: Bonnie Remy  Sent: 2/5/2024   7:32 AM EST  To: Gastroenterology Pod Clinical  Subject: Stool test                                       Good morning,     I am starting a flare up. I am having liquid loose, diarrhea 10-15 times a day, stomach pain and some blood when I wipe. My last injection was Jan 14th. My job is very stressful right now so I think that is playing a role. I am actively looking for new jobs and doing the best to manage stress. I am awake multiple times in the night to use the bathroom so I’m exhausted. I think I need a dose of steroids to get me through this flare and back on track.     Please let me know your thoughts. I also will need a prescription called in.     Thanks,  Bonnie Remy

## 2024-02-19 DIAGNOSIS — K51.011 ULCERATIVE PANCOLITIS WITH RECTAL BLEEDING (HCC): ICD-10-CM

## 2024-02-20 ENCOUNTER — TELEPHONE (OUTPATIENT)
Dept: GASTROENTEROLOGY | Facility: CLINIC | Age: 34
End: 2024-02-20

## 2024-02-20 DIAGNOSIS — K51.011 ULCERATIVE PANCOLITIS WITH RECTAL BLEEDING (HCC): ICD-10-CM

## 2024-02-20 RX ORDER — HYDROCORTISONE 25 MG/G
1 CREAM TOPICAL 2 TIMES DAILY
Qty: 60 G | Refills: 1 | Status: SHIPPED | OUTPATIENT
Start: 2024-02-20

## 2024-02-20 NOTE — TELEPHONE ENCOUNTER
----- Message from Sanjana Leblanc RN sent at 2/20/2024  3:30 PM EST -----  Regarding: FW: Stool test  Contact: [phone]    ----- Message -----  From: Alisa Rangel  Sent: 2/20/2024  10:13 AM EST  To: Gastro Ibd  Subject: FW: Stool test                                     ----- Message -----  From: Bonnie Remy  Sent: 2/19/2024   6:23 PM EST  To: Gastroenterology Pod Clinical  Subject: Stool test                                       Hi,     It helped a little but not enough.     Yes, I’m available this week for a discussion.     Thanks,  Bonnie

## 2024-02-20 NOTE — TELEPHONE ENCOUNTER
Hi,    Can we set her up for a virtual visit with me tomorrow or Thursday, perhaps at the very end of the day    Thank you

## 2024-02-21 RX ORDER — BUDESONIDE 3 MG/1
9 CAPSULE, COATED PELLETS ORAL DAILY
Qty: 270 CAPSULE | Refills: 1 | Status: SHIPPED | OUTPATIENT
Start: 2024-02-21

## 2024-02-21 NOTE — TELEPHONE ENCOUNTER
Called patient, reached voicemail, left message to call back to schedule virtual visit with Dr. Lombardi. Antoninat message sent.

## 2024-02-26 ENCOUNTER — TELEPHONE (OUTPATIENT)
Age: 34
End: 2024-02-26

## 2024-02-26 NOTE — TELEPHONE ENCOUNTER
Spoke with pt and informed her I was able to get in contact with [employer] pertaining to denial of GI pathogen panel lab.   Per Mary, from [employer] billing dept, lab test was denied due to non coverage, not from being a duplicate order. I had asked Mary to compare dx codes from previous dos in May to the recent December test; which were different. I asked Mary to add the dx code from previous May dos to the recent December lab that has been denied. Per Mary, the diagnosis codes were added and needed 30-45 days to be processed to know if it gets covered.   Patient has made aware and pt verbalized understanding.

## 2024-02-26 NOTE — TELEPHONE ENCOUNTER
Patients GI provider:  Dr. Lombardi    Number to return call: [phone]    Reason for call: Pt called in stating had stool test multiple times and insurance denied due to CPT Code. Please call pt to discuss.    Scheduled procedure/appointment date if applicable: Apt/procedure N/A

## 2024-02-26 NOTE — TELEPHONE ENCOUNTER
Pt returned call stating that CPT was duplicated and is causing her to be billed for gastro pathogen 6-11. Billining dept informed pt that office needs to remove duplicate.

## 2024-02-26 NOTE — TELEPHONE ENCOUNTER
I spoke with pt - she said that the December 20 2023 orders for stool test has a duplicate order    She spoke with her insurance - the Gastro Pathogen Panel [id number] is CPT code 50070 denied due to duplicate order on same DOS.    She said if the order is corrected she can ask her insurance to re-open the case to review for coverage

## 2024-02-26 NOTE — TELEPHONE ENCOUNTER
Called patient and connected with vm. Instructed pt to call back to discuss CPT code issue. Phone number provided in message.

## 2024-03-14 ENCOUNTER — TELEPHONE (OUTPATIENT)
Age: 34
End: 2024-03-14

## 2024-03-14 NOTE — TELEPHONE ENCOUNTER
Patients GI provider:  Dr. Lombardi    Number to return call: [phone]    Reason for call: Pt calling stating [facility] pharmacy will not ship her medication Stelera due to needing more information. Pt states she's due for her injection already. Please call [facility] pharmacy to get approved and shipped.     Scheduled procedure/appointment date if applicable: N/A

## 2024-03-15 NOTE — TELEPHONE ENCOUNTER
Patient called she is getting very frustrated because her injections are already passed due and she starting to get sick please review and reach out thank you

## 2024-03-15 NOTE — TELEPHONE ENCOUNTER
Chatted with Wing on [facility] portal and received update below:    WING  You're welcome. the pt account is showing that the shipment was stopped due to PA being needed, but this issue has been resolved and the PA is on file valid until 10/25/2024. The pt was contacted today to reschedule the order but we were not able to speak with her.

## 2024-03-15 NOTE — TELEPHONE ENCOUNTER
Called and spoke with patient. I relayed information.     She will call [facility] right away to schedule. I advised her to call me back immediately if she has any difficulties scheduling the medication and I will contact them back

## 2024-03-15 NOTE — TELEPHONE ENCOUNTER
Patient calling back in regards to the Stelera. Patient states pharmacy is refusing to ship the medication until they speak with the office. Patient is now overdue for her injection and see is leaving the state on 3/19/24.  Patient was first told by [facility] pharmacy that a prior authorization is required. Review of chart shows an authorization was obtained and is valid from 10/2023-10/2024. Patient stated that the second representative says additional information is needed. Please reach out to [facility]. Patient is asking for a phone call once this is completed.

## 2024-03-16 ENCOUNTER — NURSE TRIAGE (OUTPATIENT)
Dept: OTHER | Facility: OTHER | Age: 34
End: 2024-03-16

## 2024-03-16 NOTE — TELEPHONE ENCOUNTER
"Patient calling in having issues with pharmacy and prior authorization. Please reach out to her as soon as possible as patient needs medication.   Reason for Disposition  • [1] Caller has NON-URGENT medicine question about med that PCP prescribed AND [2] triager unable to answer question    Answer Assessment - Initial Assessment Questions  1. NAME of MEDICATION: \"What medicine are you calling about?\"      Stelara    2. QUESTION: \"What is your question?\" (e.g., medication refill, side effect)      Pt having issues getting medication from pharmacy due to prior auth. Pt concerned due to being late taking medication. '    Protocols used: Medication Question Call-ADULT-    "

## 2024-03-16 NOTE — TELEPHONE ENCOUNTER
"Regarding: medication request  ----- Message from Mar Roman sent at 3/16/2024 11:19 AM EDT -----  Pt would like a call back. \"Patient calling back in regards to the Stelera. Patient states pharmacy is refusing to ship the medication until they speak with the office. Patient is now overdue for her injection and see is leaving the state on 3/19/24.  Patient was first told by [facility] pharmacy that a prior authorization is required. Review of chart shows an authorization was obtained and is valid from 10/2023-10/2024. Patient stated that the second representative says additional information is needed.\"    "

## 2024-03-18 DIAGNOSIS — R11.0 NAUSEA: ICD-10-CM

## 2024-03-18 NOTE — TELEPHONE ENCOUNTER
"  Pt calling in, reports she is having issues with [facility] pharmacy shipping out Stelara medication. When pt called that said \"needs a patient level authorization. Office said the prior auth is for 28 days but the medication is ordered for 56 days.\" They are asking for office to call [facility] to verify order.     Pt is a week late with her stelara injection and is not feeling well. Saturday started with vomiting bile, lower left abd pain, diarrhea 8x a day with bright red rectal bleeding when wiping. No fevers. Patient is supposed to go on a vacation this week.      "

## 2024-03-18 NOTE — TELEPHONE ENCOUNTER
3/18 called [facility], spoke with Kylie. She ran claim and came back paid and stated pt can call before 1pm to sched delivery for tomorrow. Spoke to pt she was going to try calling to set up delivery, but this is what happened Friday. We were told it was good to schedule but Saturday pt got an automated message they couldn't deliver. And when she spoke to someone there was told they needed a NOEMI, Paitent  level authorization. Which is a term we have not heard before. Pt is going to try calling to schedule and will let me know if there is a problem

## 2024-03-19 RX ORDER — ONDANSETRON 4 MG/1
4 TABLET, FILM COATED ORAL EVERY 8 HOURS PRN
Qty: 20 TABLET | Refills: 1 | Status: SHIPPED | OUTPATIENT
Start: 2024-03-19

## 2024-04-05 ENCOUNTER — TELEPHONE (OUTPATIENT)
Age: 34
End: 2024-04-05

## 2024-04-05 NOTE — TELEPHONE ENCOUNTER
Called patient and had been connected to the . I lvm relaying information provided by Rona from [facility] pertaining to a bill matter from DOS 12.20.23. Per Rona, possible duplicate order is maybe ins not covering for GI panel twice in a year because all labs were the same with same dx code. Pt advised to call ins to confirm if that is in fact the issue. Furthermore, pt's bill is on hold for now, per Rona.

## 2024-04-05 NOTE — TELEPHONE ENCOUNTER
Patients GI provider:  Dr. Lombardi    Number to return call: [phone]    Reason for call: Pt calling stating she recd a bill from [employer] due to labs being ran twice for the same test on labs that was ordered in Dec 2023. Her insurance states that the test did not need to be ran twice. Pt is looking for some codes to be changed. Pt will call the billing office and her insurance for more information    Scheduled procedure/appointment date if applicable: n/a

## 2024-09-24 ENCOUNTER — TELEPHONE (OUTPATIENT)
Age: 34
End: 2024-09-24

## 2024-09-24 NOTE — TELEPHONE ENCOUNTER
Patients GI provider:  Dr. Burgos    Number to return call:  [phone]  Fax [phone]    Reason for call: Isi HESTER Cancer specialist calling for pt came as new pt to her and she wants to get some more info for pt. She has requested pts last visit with our GI Dr advised was on 5/17/2023 in [place]. Also told her before that pt has visited [facility] . Please contact Isi at her ph for further details for the pt she has requested to fax her she has given the fax #    Scheduled procedure/appointment date if applicable: None

## 2024-10-02 ENCOUNTER — TELEPHONE (OUTPATIENT)
Dept: GASTROENTEROLOGY | Facility: CLINIC | Age: 34
End: 2024-10-02

## 2024-10-02 NOTE — TELEPHONE ENCOUNTER
Good morning,    Can we please get this pt in ASAP for urgent visit before or by 10/11/24.  Her authorization for her Stelara will  on 10/25/24 and pt has not been seen since 2023.    Thank you.

## 2024-10-22 DIAGNOSIS — K51.00 ULCERATIVE PANCOLITIS WITHOUT COMPLICATION (HCC): ICD-10-CM

## 2024-10-22 RX ORDER — USTEKINUMAB 90 MG/ML
INJECTION, SOLUTION SUBCUTANEOUS
Qty: 1 ML | Refills: 6 | Status: SHIPPED | OUTPATIENT
Start: 2024-10-22

## 2024-10-25 ENCOUNTER — TELEPHONE (OUTPATIENT)
Age: 34
End: 2024-10-25

## 2024-10-25 DIAGNOSIS — K51.00 ULCERATIVE PANCOLITIS WITHOUT COMPLICATION (HCC): ICD-10-CM

## 2024-10-25 RX ORDER — USTEKINUMAB 90 MG/ML
INJECTION, SOLUTION SUBCUTANEOUS
Qty: 1 ML | Refills: 0 | Status: CANCELLED | OUTPATIENT
Start: 2024-10-25

## 2024-10-25 NOTE — TELEPHONE ENCOUNTER
Reason for call: pt states [facility] did not receive medication-= asking for it to be resent   [x] Refill   [] Prior Auth  [] Other:     Office:   [] PCP/Provider -   [x] Specialty/Provider - GI    Medication: ustekinumab (Stelara) 90 mg/mL subcutaneous injection     Dose/Frequency:  INJECT 1 ML (90 MG TOTAL) UNDER THE SKIN ONCE FOR 1 DOSE 8 WEEKS AFTER INFUSION DOSE, THEN EVERY 8 WEEKS THEREAFTER,     Quantity: 1 mL     Pharmacy: [facility] [address]     Does the patient have enough for 3 days?   [] Yes   [x] No - Send as HP to POD

## 2024-10-25 NOTE — TELEPHONE ENCOUNTER
I called [facility] and spoke with Danilo, Danilo was able to call the patient during our phone call and set up delivery for Stelara with prescription from 10/22/2024.   Danilo states also received a message from 10/22 Stelara prescription there will need to be a NOEMI. Danilo aware on our records pts Stelara authorization is due to  today 10/25/24.

## 2024-10-30 ENCOUNTER — NURSE TRIAGE (OUTPATIENT)
Age: 34
End: 2024-10-30

## 2024-10-30 NOTE — TELEPHONE ENCOUNTER
"SPOKE WITH JAZLYN FROM [facility] TEAM [phone]. REQUESTING RETURN CALL FOR ADDITIONAL CLINICAL INFORMATION FOR PT STELARA. CASE # [id number]. PLEASE RESPOND WITHIN 48 HOURS. THANK YOU.         Answer Assessment - Initial Assessment Questions  1. REASON FOR CALL: \"What is the main reason for your call?\" or \"How can I best help you?\"      SPOKE WITH JAZLYN FROM [facility] TEAM [phone]. REQUESTING RETURN CALL FOR ADDITIONAL CLINICAL INFORMATION FOR PT STELARA. CASE # [id number]. PLEASE RESPOND WITHIN 48 HOURS. THANK YOU.    Protocols used: Information Only Call - No Triage-Adult-[place]    "

## 2024-10-30 NOTE — TELEPHONE ENCOUNTER
I called and spoke with the patient. Explained there was a duplicate Stelara prescription which was discontinued as to why she receive the message and there is still an active prescription from 10/22 [facility].

## 2024-10-30 NOTE — TELEPHONE ENCOUNTER
Pt called refill line and is questioning why she got a message in my chart stating that her medication was denied. Pt stated this medication is working and really needs it. Pt would like a call back from the office to explain what is going on with this medication,

## 2024-10-31 NOTE — TELEPHONE ENCOUNTER
Received prior authorization request form from [employer].  Sent to task AP for signature.

## 2024-11-01 DIAGNOSIS — K51.00 ULCERATIVE PANCOLITIS WITHOUT COMPLICATION (HCC): Primary | ICD-10-CM

## 2024-11-01 RX ORDER — USTEKINUMAB 90 MG/ML
90 INJECTION, SOLUTION SUBCUTANEOUS
Qty: 1 ML | Refills: 5 | Status: SHIPPED | OUTPATIENT
Start: 2024-11-01 | End: 2024-12-27

## 2024-11-01 NOTE — TELEPHONE ENCOUNTER
Stelara 90mg every 56 days has been approved.     Please send script to [facility] pharmacy on file.     Thank you.

## 2024-11-01 NOTE — TELEPHONE ENCOUNTER
SPOKE WITH JAZLYN FROM [facility] TEAM [phone]. REQUESTING RETURN CALL FOR ADDITIONAL CLINICAL INFORMATION FOR PT [place]. CASE # [id number]. PLEASE RESPOND WITHIN 48 HOURS. THANK YOU.

## 2024-11-12 ENCOUNTER — TELEPHONE (OUTPATIENT)
Age: 34
End: 2024-11-12

## 2024-11-12 ENCOUNTER — TELEMEDICINE (OUTPATIENT)
Age: 34
End: 2024-11-12
Payer: COMMERCIAL

## 2024-11-12 VITALS — HEIGHT: 62 IN | BODY MASS INDEX: 25.76 KG/M2 | WEIGHT: 140 LBS

## 2024-11-12 DIAGNOSIS — D84.9 IMMUNOCOMPROMISED STATE (HCC): ICD-10-CM

## 2024-11-12 DIAGNOSIS — K51.00 ULCERATIVE PANCOLITIS WITHOUT COMPLICATION (HCC): Primary | ICD-10-CM

## 2024-11-12 DIAGNOSIS — E61.8 MICRONUTRIENTS DEFICIENCY: ICD-10-CM

## 2024-11-12 PROCEDURE — 99214 OFFICE O/P EST MOD 30 MIN: CPT | Performed by: INTERNAL MEDICINE

## 2024-11-12 NOTE — PROGRESS NOTES
Virtual Regular Visit  Name: Bonnie Remy      : 1990      MRN: [id number]  Encounter Provider: Denia Lombardi MD  Encounter Date: 2024   Encounter department: [facility]    Verification of patient location:    Patient is located at Home in the following state in which I hold an active license [place]    Assessment & Plan  Ulcerative pancolitis without complication (HCC)  The patient is a 34-year-old woman with ulcerative pancolitis previously on Humira for over 1 year possibly with improvement but then with side effects including dry heaving, no energy, hair loss, recurrent C. difficile previously treated with prolonged courses of vancomycin and Dificid and eventually having fecal transplant 2023, who presents now for follow-up. Overall now feeling better.     Colonoscopy 2023 with severe edematous erythematous friable mucosa Tamez score 3 and biopsies with inflammation negative for CMV.      Most recent CMP from 2023 normal.  CBC with elevated white blood cell count and platelet count.  Calprotectin greater than 1900.  Pancreatic elastase normal.  Stelara level 9.4 with no antidrug antibodies      The Stelara every 56 days  Repeat blood work ordered today  Repeat quant gold and hepatitis panel ordered today  Repeat calprotectin  Bowel ultrasound  Can order colonoscopy at next visit    Orders:    CBC and differential; Future    Comprehensive metabolic panel; Future    C-reactive protein; Future    Quantiferon TB Gold Plus Assay; Future    Chronic Hepatitis Panel; Future    US abdomen limited; Future    Vitamin B12; Future    Vitamin D 25 hydroxy; Future    Iron Panel (Includes Ferritin, Iron Sat%, Iron, and TIBC); Future    Zinc; Future    Copper Level; Future    Folate; Future    CBC and differential    Comprehensive metabolic panel    C-reactive protein    Vitamin B12    Vitamin D 25 hydroxy    Zinc    Copper Level     Folate    Immunocompromised state (HCC)  Avoid live virus vaccines  Yearly flu shot  COVID vaccine and booster  Pneumonia vaccine  Shingrix  Routine skin exams with the dermatologist    Orders:    CBC and differential; Future    Comprehensive metabolic panel; Future    C-reactive protein; Future    Quantiferon TB Gold Plus Assay; Future    Chronic Hepatitis Panel; Future    US abdomen limited; Future    Vitamin B12; Future    Vitamin D 25 hydroxy; Future    Iron Panel (Includes Ferritin, Iron Sat%, Iron, and TIBC); Future    Zinc; Future    Copper Level; Future    Folate; Future    CBC and differential    Comprehensive metabolic panel    C-reactive protein    Vitamin B12    Vitamin D 25 hydroxy    Zinc    Copper Level    Folate    Micronutrients deficiency    Orders:    CBC and differential; Future    Comprehensive metabolic panel; Future    C-reactive protein; Future    Quantiferon TB Gold Plus Assay; Future    Chronic Hepatitis Panel; Future    US abdomen limited; Future    Vitamin B12; Future    Vitamin D 25 hydroxy; Future    Iron Panel (Includes Ferritin, Iron Sat%, Iron, and TIBC); Future    Zinc; Future    Copper Level; Future    Folate; Future    CBC and differential    Comprehensive metabolic panel    C-reactive protein    Vitamin B12    Vitamin D 25 hydroxy    Zinc    Copper Level    Folate        Encounter provider Denia Lombardi MD    The patient was identified by name and date of birth. Bonnie Jonel was informed that this is a telemedicine visit and that the visit is being conducted through the Epic Embedded platform. She agrees to proceed..  My office door was closed. No one else was in the room.  She acknowledged consent and understanding of privacy and security of the video platform. The patient has agreed to participate and understands they can discontinue the visit at any time.    Patient is aware this is a billable service.     History of Present Illness       Bonnie Remy is a 34  "y.o. female who presents with UC.     She has been feeling well for the past 6 months.   She is on stelara every 8 weeks. She felt a little tired initially but now she is used to it.   2-3 stools per day, somewhat loose but more formed. Some urgency, perhaps driven by anxiety. Much less sporadic. No blood or mucous. No abdominal pain or rectal pain.   No heartburn, dysphagia, nausea, vomiting. She gained weight. Appetite is good.   She has an appointment tomorrow with dermatology - she had a rash on her stomach. It has been there for 2 years. It never went away. No mouth sores. No joint pains.       10 point ROS reviewed and negative, except as above          Objective     Ht 5' 2\" (1.575 m)   Wt 63.5 kg (140 lb)   BMI 25.61 kg/m²   PHYSICAL EXAMINATION:    General Appearance:   Alert, cooperative, no distress   HEENT:  Normocephalic, atraumatic, anicteric. Neck supple, symmetrical, trachea midline.   Lungs:   Equal chest rise and unlabored breathing, normal effort, no coughing.   Cardiovascular:   No visualized JVD.   Abdomen:   No abdominal distension.   Skin:   No jaundice, rashes, or lesions.    Musculoskeletal:   Normal range of motion visualized.   Psych:  Normal affect and normal insight.   Neuro:  Alert and appropriate.         Visit Time  I have spent a total time of 20 minutes in caring for this patient on the day of the visit/encounter including Diagnostic results, Prognosis, Risks and benefits of tx options, Instructions for management, Patient and family education, Importance of tx compliance, Risk factor reductions, Impressions, Counseling / Coordination of care, Documenting in the medical record, Reviewing / ordering tests, medicine, procedures  , and Obtaining or reviewing history  .          "

## 2024-11-12 NOTE — TELEPHONE ENCOUNTER
Hi,    Can we get her recent blood work from [facility] ([phone])?    Thank you!

## 2024-11-12 NOTE — ASSESSMENT & PLAN NOTE
The patient is a 34-year-old woman with ulcerative pancolitis previously on Humira for over 1 year possibly with improvement but then with side effects including dry heaving, no energy, hair loss, recurrent C. difficile previously treated with prolonged courses of vancomycin and Dificid and eventually having fecal transplant August 2023, who presents now for follow-up. Overall now feeling better.     Colonoscopy August 2023 with severe edematous erythematous friable mucosa Tamez score 3 and biopsies with inflammation negative for CMV.      Most recent CMP from December 2023 normal.  CBC with elevated white blood cell count and platelet count.  Calprotectin greater than 1900.  Pancreatic elastase normal.  Stelara level 9.4 with no antidrug antibodies      The Stelara every 56 days  Repeat blood work ordered today  Repeat quant gold and hepatitis panel ordered today  Repeat calprotectin  Bowel ultrasound  Can order colonoscopy at next visit    Orders:    CBC and differential; Future    Comprehensive metabolic panel; Future    C-reactive protein; Future    Quantiferon TB Gold Plus Assay; Future    Chronic Hepatitis Panel; Future    US abdomen limited; Future    Vitamin B12; Future    Vitamin D 25 hydroxy; Future    Iron Panel (Includes Ferritin, Iron Sat%, Iron, and TIBC); Future    Zinc; Future    Copper Level; Future    Folate; Future    CBC and differential    Comprehensive metabolic panel    C-reactive protein    Vitamin B12    Vitamin D 25 hydroxy    Zinc    Copper Level    Folate

## 2024-11-13 ENCOUNTER — TELEPHONE (OUTPATIENT)
Age: 34
End: 2024-11-13

## 2024-11-13 NOTE — TELEPHONE ENCOUNTER
I called pt to schedule a IBD  f/u appt with Jimmy or Dr. Wood in about 6 months.  Left voicemail and requested call back.

## 2024-11-14 NOTE — TELEPHONE ENCOUNTER
Spoke with staff regarding patients recent labs. As per staff will be sending labs via fax to phone number I provided.   In addition, I inquired on behalf of the patient; she hasn't had a provider reach out to review results and when can this be done? I was informed from staff, pt has left messages from days ago and is uncertain of the turn around time.    Fax has been received and will be uploaded to media.

## 2024-12-04 ENCOUNTER — RESULTS FOLLOW-UP (OUTPATIENT)
Age: 34
End: 2024-12-04

## 2024-12-04 ENCOUNTER — HOSPITAL ENCOUNTER (OUTPATIENT)
Dept: ULTRASOUND IMAGING | Facility: HOSPITAL | Age: 34
Discharge: HOME | End: 2024-12-04
Attending: INTERNAL MEDICINE
Payer: COMMERCIAL

## 2024-12-04 DIAGNOSIS — D84.9 IMMUNOCOMPROMISED STATE (HCC): ICD-10-CM

## 2024-12-04 DIAGNOSIS — K51.00 ULCERATIVE PANCOLITIS WITHOUT COMPLICATION (HCC): ICD-10-CM

## 2024-12-04 DIAGNOSIS — E61.8 MICRONUTRIENTS DEFICIENCY: ICD-10-CM

## 2024-12-04 PROCEDURE — 76705 ECHO EXAM OF ABDOMEN: CPT

## 2024-12-09 ENCOUNTER — TELEPHONE (OUTPATIENT)
Age: 34
End: 2024-12-09

## 2024-12-09 NOTE — TELEPHONE ENCOUNTER
Patient calling requesting her lab scripts be e-mailed stating she uses [facility] for insurance reason and will need her lab scripts from 11/12/2024 appt e-mail to:   [email or web address] - [facility] requires her to paper copy.  Patient would like a call back once scripts are e-mailed

## 2024-12-13 ENCOUNTER — RESULTS FOLLOW-UP (OUTPATIENT)
Dept: GASTROENTEROLOGY | Facility: CLINIC | Age: 34
End: 2024-12-13

## 2024-12-14 LAB
25(OH)D3 SERPL-MCNC: 27 NG/ML (ref 30–100)
ALBUMIN SERPL-MCNC: 4 G/DL (ref 3.6–5.1)
ALBUMIN/GLOB SERPL: 1.4 (CALC) (ref 1–2.5)
ALP SERPL-CCNC: 69 U/L (ref 31–125)
ALT SERPL-CCNC: 12 U/L (ref 6–29)
AST SERPL-CCNC: 16 U/L (ref 10–30)
BASOPHILS # BLD AUTO: 156 CELLS/UL (ref 0–200)
BASOPHILS NFR BLD AUTO: 1.2 %
BILIRUB SERPL-MCNC: 0.4 MG/DL (ref 0.2–1.2)
BUN SERPL-MCNC: 10 MG/DL (ref 7–25)
BUN/CREAT SERPL: NORMAL (CALC) (ref 6–22)
CALCIUM SERPL-MCNC: 9 MG/DL (ref 8.6–10.2)
CHLORIDE SERPL-SCNC: 104 MMOL/L (ref 98–110)
CO2 SERPL-SCNC: 26 MMOL/L (ref 20–32)
COPPER SERPL-MCNC: 106 MCG/DL (ref 70–175)
CREAT SERPL-MCNC: 0.64 MG/DL (ref 0.5–0.97)
CRP SERPL-MCNC: <3 MG/L
EOSINOPHIL # BLD AUTO: 351 CELLS/UL (ref 15–500)
EOSINOPHIL NFR BLD AUTO: 2.7 %
ERYTHROCYTE [DISTWIDTH] IN BLOOD BY AUTOMATED COUNT: 13.4 % (ref 11–15)
GFR/BSA.PRED SERPLBLD CYS-BASED-ARV: 117 ML/MIN/1.73M2
GLOBULIN SER CALC-MCNC: 2.8 G/DL (CALC) (ref 1.9–3.7)
GLUCOSE SERPL-MCNC: 85 MG/DL (ref 65–99)
HAV AB SER QL IA: ABNORMAL
HBV CORE AB SERPL QL IA: ABNORMAL
HBV SURFACE AB SER QL IA: REACTIVE
HBV SURFACE AG SERPL QL IA: ABNORMAL
HCT VFR BLD AUTO: 38.1 % (ref 35–45)
HCV AB SERPL QL IA: ABNORMAL
HGB BLD-MCNC: 12.3 G/DL (ref 11.7–15.5)
LYMPHOCYTES # BLD AUTO: 3367 CELLS/UL (ref 850–3900)
LYMPHOCYTES NFR BLD AUTO: 25.9 %
MCH RBC QN AUTO: 30.4 PG (ref 27–33)
MCHC RBC AUTO-ENTMCNC: 32.3 G/DL (ref 32–36)
MCV RBC AUTO: 94.3 FL (ref 80–100)
MONOCYTES # BLD AUTO: 845 CELLS/UL (ref 200–950)
MONOCYTES NFR BLD AUTO: 6.5 %
NEUTROPHILS # BLD AUTO: 8281 CELLS/UL (ref 1500–7800)
NEUTROPHILS NFR BLD AUTO: 63.7 %
PLATELET # BLD AUTO: 499 THOUSAND/UL (ref 140–400)
PMV BLD REES-ECKER: 10.4 FL (ref 7.5–12.5)
POTASSIUM SERPL-SCNC: 4.7 MMOL/L (ref 3.5–5.3)
PROT SERPL-MCNC: 6.8 G/DL (ref 6.1–8.1)
RBC # BLD AUTO: 4.04 MILLION/UL (ref 3.8–5.1)
SODIUM SERPL-SCNC: 135 MMOL/L (ref 135–146)
WBC # BLD AUTO: 13 THOUSAND/UL (ref 3.8–10.8)
ZINC SERPL-MCNC: 57 MCG/DL (ref 60–130)

## 2024-12-16 LAB
25(OH)D3 SERPL-MCNC: 27 NG/ML (ref 30–100)
ALBUMIN SERPL-MCNC: 4 G/DL (ref 3.6–5.1)
ALBUMIN/GLOB SERPL: 1.4 (CALC) (ref 1–2.5)
ALP SERPL-CCNC: 69 U/L (ref 31–125)
ALT SERPL-CCNC: 12 U/L (ref 6–29)
AST SERPL-CCNC: 16 U/L (ref 10–30)
BASOPHILS # BLD AUTO: 156 CELLS/UL (ref 0–200)
BASOPHILS NFR BLD AUTO: 1.2 %
BILIRUB SERPL-MCNC: 0.4 MG/DL (ref 0.2–1.2)
BUN SERPL-MCNC: 10 MG/DL (ref 7–25)
BUN/CREAT SERPL: NORMAL (CALC) (ref 6–22)
CALCIUM SERPL-MCNC: 9 MG/DL (ref 8.6–10.2)
CHLORIDE SERPL-SCNC: 104 MMOL/L (ref 98–110)
CO2 SERPL-SCNC: 26 MMOL/L (ref 20–32)
COPPER SERPL-MCNC: 106 MCG/DL (ref 70–175)
CREAT SERPL-MCNC: 0.64 MG/DL (ref 0.5–0.97)
CRP SERPL-MCNC: <3 MG/L
EOSINOPHIL # BLD AUTO: 351 CELLS/UL (ref 15–500)
EOSINOPHIL NFR BLD AUTO: 2.7 %
ERYTHROCYTE [DISTWIDTH] IN BLOOD BY AUTOMATED COUNT: 13.4 % (ref 11–15)
GFR/BSA.PRED SERPLBLD CYS-BASED-ARV: 117 ML/MIN/1.73M2
GLOBULIN SER CALC-MCNC: 2.8 G/DL (CALC) (ref 1.9–3.7)
GLUCOSE SERPL-MCNC: 85 MG/DL (ref 65–99)
HAV AB SER QL IA: ABNORMAL
HBV CORE AB SERPL QL IA: ABNORMAL
HBV SURFACE AB SER QL IA: REACTIVE
HBV SURFACE AG SERPL QL IA: ABNORMAL
HCT VFR BLD AUTO: 38.1 % (ref 35–45)
HCV AB SERPL QL IA: ABNORMAL
HGB BLD-MCNC: 12.3 G/DL (ref 11.7–15.5)
LYMPHOCYTES # BLD AUTO: 3367 CELLS/UL (ref 850–3900)
LYMPHOCYTES NFR BLD AUTO: 25.9 %
M TB IFN-G CD4+ BCKGRND COR BLD-ACNC: 0 IU/ML
M TB IFN-G CD4+ BCKGRND COR BLD-ACNC: 0 IU/ML
M TB IFN-G CD4+ T-CELLS BLD-ACNC: 0.05 IU/ML
M TB TUBERC IFN-G BLD QL: NEGATIVE
M TB TUBERC IGNF/MITOGEN IGNF CONTROL: 7.6 IU/ML
MCH RBC QN AUTO: 30.4 PG (ref 27–33)
MCHC RBC AUTO-ENTMCNC: 32.3 G/DL (ref 32–36)
MCV RBC AUTO: 94.3 FL (ref 80–100)
MONOCYTES # BLD AUTO: 845 CELLS/UL (ref 200–950)
MONOCYTES NFR BLD AUTO: 6.5 %
NEUTROPHILS # BLD AUTO: 8281 CELLS/UL (ref 1500–7800)
NEUTROPHILS NFR BLD AUTO: 63.7 %
PLATELET # BLD AUTO: 499 THOUSAND/UL (ref 140–400)
PMV BLD REES-ECKER: 10.4 FL (ref 7.5–12.5)
POTASSIUM SERPL-SCNC: 4.7 MMOL/L (ref 3.5–5.3)
PROT SERPL-MCNC: 6.8 G/DL (ref 6.1–8.1)
RBC # BLD AUTO: 4.04 MILLION/UL (ref 3.8–5.1)
SODIUM SERPL-SCNC: 135 MMOL/L (ref 135–146)
WBC # BLD AUTO: 13 THOUSAND/UL (ref 3.8–10.8)
ZINC SERPL-MCNC: 57 MCG/DL (ref 60–130)

## 2024-12-17 ENCOUNTER — RESULTS FOLLOW-UP (OUTPATIENT)
Age: 34
End: 2024-12-17

## 2024-12-18 ENCOUNTER — RESULTS FOLLOW-UP (OUTPATIENT)
Age: 34
End: 2024-12-18

## 2025-01-03 ENCOUNTER — RESULTS FOLLOW-UP (OUTPATIENT)
Age: 35
End: 2025-01-03

## 2025-04-10 ENCOUNTER — TELEPHONE (OUTPATIENT)
Age: 35
End: 2025-04-10

## 2025-04-10 NOTE — TELEPHONE ENCOUNTER
Pt calling as she received a bill from a  for a procedure that she paid months ago . Transferred over to billing .